# Patient Record
Sex: FEMALE
[De-identification: names, ages, dates, MRNs, and addresses within clinical notes are randomized per-mention and may not be internally consistent; named-entity substitution may affect disease eponyms.]

---

## 2017-11-21 ENCOUNTER — HOSPITAL ENCOUNTER (INPATIENT)
Dept: HOSPITAL 31 - C.ER | Age: 67
LOS: 7 days | Discharge: NURSING, REHAB OR LONG-TERM CARE | DRG: 871 | End: 2017-11-28
Attending: INTERNAL MEDICINE | Admitting: INTERNAL MEDICINE
Payer: MEDICARE

## 2017-11-21 VITALS — BODY MASS INDEX: 23.8 KG/M2

## 2017-11-21 DIAGNOSIS — I13.2: ICD-10-CM

## 2017-11-21 DIAGNOSIS — M80.031K: ICD-10-CM

## 2017-11-21 DIAGNOSIS — J18.9: ICD-10-CM

## 2017-11-21 DIAGNOSIS — I50.9: ICD-10-CM

## 2017-11-21 DIAGNOSIS — Z99.2: ICD-10-CM

## 2017-11-21 DIAGNOSIS — A41.9: Primary | ICD-10-CM

## 2017-11-21 DIAGNOSIS — T86.12: ICD-10-CM

## 2017-11-21 DIAGNOSIS — N18.6: ICD-10-CM

## 2017-11-21 LAB
ALBUMIN/GLOB SERPL: 0.9 {RATIO} (ref 1–2.1)
ALP SERPL-CCNC: 355 U/L (ref 38–126)
ALT SERPL-CCNC: 31 U/L (ref 9–52)
AST SERPL-CCNC: 36 U/L (ref 14–36)
BASE EXCESS BLDV CALC-SCNC: 11.6 MMOL/L (ref 0–2)
BASOPHILS # BLD AUTO: 0.1 K/UL (ref 0–0.2)
BASOPHILS NFR BLD: 0.2 % (ref 0–2)
BILIRUB SERPL-MCNC: 3.5 MG/DL (ref 0.2–1.3)
BUN SERPL-MCNC: 19 MG/DL (ref 7–17)
CALCIUM SERPL-MCNC: 8.9 MG/DL (ref 8.6–10.4)
CHLORIDE SERPL-SCNC: 92 MMOL/L (ref 98–107)
CO2 SERPL-SCNC: 32 MMOL/L (ref 22–30)
EOSINOPHIL # BLD AUTO: 0.1 K/UL (ref 0–0.7)
EOSINOPHIL NFR BLD: 0.4 % (ref 0–4)
ERYTHROCYTE [DISTWIDTH] IN BLOOD BY AUTOMATED COUNT: 17.4 % (ref 11.5–14.5)
GLOBULIN SER-MCNC: 3.8 GM/DL (ref 2.2–3.9)
GLUCOSE SERPL-MCNC: 85 MG/DL (ref 65–105)
HCT VFR BLD CALC: 28 % (ref 34–47)
LYMPHOCYTES # BLD AUTO: 0.9 K/UL (ref 1–4.3)
LYMPHOCYTES NFR BLD AUTO: 3.6 % (ref 20–40)
MCH RBC QN AUTO: 32.2 PG (ref 27–31)
MCHC RBC AUTO-ENTMCNC: 33 G/DL (ref 33–37)
MCV RBC AUTO: 97.5 FL (ref 81–99)
MONOCYTES # BLD: 3.1 K/UL (ref 0–0.8)
MONOCYTES NFR BLD: 11.9 % (ref 0–10)
NEUTROPHILS NFR BLD AUTO: 77 % (ref 50–75)
NRBC BLD AUTO-RTO: 0 % (ref 0–2)
PCO2 BLDV: 56 MMHG (ref 40–60)
PH BLDV: 7.44 [PH] (ref 7.32–7.43)
PLATELET # BLD: 251 K/UL (ref 130–400)
PMV BLD AUTO: 8.8 FL (ref 7.2–11.7)
POTASSIUM SERPL-SCNC: 3.4 MMOL/L (ref 3.6–5.2)
PROT SERPL-MCNC: 7.2 G/DL (ref 6.3–8.3)
SODIUM SERPL-SCNC: 135 MMOL/L (ref 132–148)
TOTAL CELLS COUNTED BLD: 100
TROPONIN I SERPL-MCNC: 0.01 NG/ML (ref 0–0.12)
WBC # BLD AUTO: 26.2 K/UL (ref 4.8–10.8)

## 2017-11-21 RX ADMIN — OXYCODONE HYDROCHLORIDE AND ACETAMINOPHEN PRN TAB: 5; 325 TABLET ORAL at 22:52

## 2017-11-21 NOTE — C.PDOC
History Of Present Illness


67 year old female with a history of Fibromyalgia, Osteoporosis, and End Stage 

Renal was brought to the ED via EMS with complaints of chest pain, fever, 

sweating, and full body aches beginning earlier today while completing 

hemodialysis. Patient is also complaining of large mass to left breast that has 

been followed by PMD. Patient was seen in [facility] on 2017 for 

pain to legs and arms and was diagnoses with pathological fractures to 

bilateral lower extremities and right upper extremities. Patient was seen in an 

ED on 11/3/2017 with similar complaints of total body pain. Patient is 

currently in a rehabilitation center for lower extremity and right upper 

extremity. Patient reports she has had mammograms regarding the breast mass 

with no answers. Patient denies injury, trauma, weakness, numbness, shortness 

of breath, or other complaints a this time. 





Nephrologist: Dr. Silva





Time Seen by Provider: 17 17:14


Chief Complaint (Nursing): Chest Pain


History Per: Patient


History/Exam Limitations: no limitations


Onset/Duration Of Symptoms: Worse Since (earlier today )


Current Symptoms Are (Timing): Still Present


Quality: "Pain"


Associated Symptoms: denies: Nausea, Dyspnea


Modifying Factors: None


Exacerbating Factors: None


Alleviating Factors: None


Recent travel outside of the [place]: No


Additional History Per: EMS, Family, Prior Records





Past Medical History


Reviewed: Historical Data, Nursing Documentation, Vital Signs


Vital Signs: 


 Last Vital Signs











Temp  101.0 F H  17 17:33


 


Pulse  80   17 17:34


 


Resp  18   17 17:33


 


BP  86/42 L  17 17:33


 


Pulse Ox  95   17 18:10














- Medical History


PMH: Anxiety, Arthritis, Diverticulitis, Fibromyalgia, Fractures (Current right 

leg fx), HTN, Osteoporosis, Pancreatitis, End Stage Renal Disease, Chronic 

Kidney Disease, TIA


Surgical History: Cholecystectomy





- [facility]








 (17)


BUNIONECT/SFT/OSTEOTOMY (13)


IMMOBILIZATION OF LEFT LOWER LEG USING SPLINT (17)


IMMOBILIZATION OF RIGHT UPPER EXTREMITY USING SPLINT (17)


OPEN REDUCT-INT FIX TOE (13)








Family History: States: Unknown Family Hx





- Social History


Hx Alcohol Use: No


Hx Substance Use: No





- Immunization History


Hx Tetanus Toxoid Vaccination: No


Hx Influenza Vaccination: No


Hx Pneumococcal Vaccination: No





Review Of Systems


Constitutional: Positive for: Fever, Other (general full body aches ).  

Negative for: Chills


Cardiovascular: Positive for: Chest Pain.  Negative for: Palpitations


Respiratory: Negative for: Cough, Shortness of Breath


Skin: Positive for: Other (large breast mass )





Physical Exam





- Physical Exam


Appears: Non-toxic, No Acute Distress


Skin: Warm, Dry, No Rash


Head: Atraumatic, Normacephalic, No Tenderness


Oral Mucosa: Dry


Neck: Supple


Chest: Symmetrical, No Deformity


Cardiovascular: Rhythm Regular, No Murmur


Respiratory: No Rales, No Rhonchi, No Wheezing, Other (clear to auscultation 

bilaterally )


Gastrointestinal/Abdominal: Soft, No Tenderness, No Distention, No Guarding, No 

Rebound


Extremity: Other (bilateral lower extremities and right upper extremity are in 

splints )


Neurological/Psych: Oriented x3, Other (patient is cooperative )





ED Course And Treatment


ECG: Interpreted By Me, Viewed By Me


ECG Rhythm: Sinus Rhythm


Interpretation Of ECG: No ST elevations or depressions.


Rate From EC


O2 Sat by Pulse Oximetry: 95 (RA)


Pulse Ox Interpretation: Normal





- Radiology


CXR: Interpreted by Me, Viewed By Me


CXR Interpretation: Yes: Other (Bilateral interstital changes )


Progress Note: VBG, EKG, CXR, labs, and blood work were ordered. Patient was 

given Vancomycin and Piperacillin/Tazobactam IVPB.





Medical Decision Making


Medical Decision Making: 





Case discussed with Dr. Allison. She reports she was treating patient with 

Zithromycin. Patient has failed out patient treatment for a pneumonia. 





Disposition


Discussed With : Sulma Allison


Doctor Will See Patient In The: Hospital


Counseled Patient/Family Regarding: Smoking Cessation





- Disposition


Disposition: HOSPITALIZED


Disposition Time: 18:09


Condition: GUARDED


Forms:  [facility] (English)





- POA


Present On Arrival: None





- Clinical Impression


Clinical Impression: 


 Pneumonia, Fever








- Scribe Statement


The provider has reviewed the documentation as recorded by the Scribe





Sally Daniel





All medical record entries made by the Scribe were at my direction and 

personally dictated by me. I have reviewed the chart and agree that the record 

accurately reflects my personal performance of the history, physical exam, 

medical decision making, and the department course for this patient. I have 

also personally directed, reviewed, and agree with the discharge instructions 

and disposition.





Decision To Admit





- Pt Status Changed To:


Hospital Disposition Of: Inpatient





- Admit Certification


Admit to Inpatient:: After my assessment, the patient will require 

hospitalization for at least two midnights.  This is because of the severity of 

symptoms shown, intensity of services needed, and/or the medical risk in this 

patient being treated as an outpatient.





- InPatient:


Physician Admission Certification: I certify that this patient requires 2 or 

more midnights of care for the following reason:: failled out patient therapy 

for pneumonia and fever





- .


Bed Request Type: Regular


Patient Diagnosis: 


 Pneumonia, Fever

## 2017-11-21 NOTE — RAD
PROCEDURE:  CHEST RADIOGRAPH, 1 VIEW



HISTORY:

chest pain



COMPARISON:

None available.



FINDINGS:



LUNGS:

Limited examination. Opacity at both lung bases.  Rule out pneumonia. 

 Follow-up advised.



PLEURA:

No pneumothorax or pleural fluid seen.



CARDIOVASCULAR:

Congestive change.



OSSEOUS STRUCTURES:

No significant abnormalities.



VISUALIZED UPPER ABDOMEN:

Normal.



OTHER FINDINGS:

Left subclavian vascular stent 



IMPRESSION:

Bibasilar opacities. Congestive change. Pneumonia versus pulmonary 

edema. .  Followup advised.

## 2017-11-22 RX ADMIN — TAZOBACTAM SODIUM AND PIPERACILLIN SODIUM SCH MLS/HR: 250; 2 INJECTION, SOLUTION INTRAVENOUS at 14:06

## 2017-11-22 RX ADMIN — TAZOBACTAM SODIUM AND PIPERACILLIN SODIUM SCH MLS/HR: 250; 2 INJECTION, SOLUTION INTRAVENOUS at 21:45

## 2017-11-22 RX ADMIN — IPRATROPIUM BROMIDE AND ALBUTEROL SULFATE SCH ML: .5; 3 SOLUTION RESPIRATORY (INHALATION) at 13:30

## 2017-11-22 RX ADMIN — IPRATROPIUM BROMIDE AND ALBUTEROL SULFATE SCH ML: .5; 3 SOLUTION RESPIRATORY (INHALATION) at 07:53

## 2017-11-22 RX ADMIN — OXYCODONE HYDROCHLORIDE AND ACETAMINOPHEN PRN TAB: 5; 325 TABLET ORAL at 14:04

## 2017-11-22 RX ADMIN — IPRATROPIUM BROMIDE AND ALBUTEROL SULFATE SCH ML: .5; 3 SOLUTION RESPIRATORY (INHALATION) at 20:19

## 2017-11-22 RX ADMIN — METOPROLOL SUCCINATE SCH MG: 100 TABLET, EXTENDED RELEASE ORAL at 09:10

## 2017-11-22 RX ADMIN — TAZOBACTAM SODIUM AND PIPERACILLIN SODIUM SCH MLS/HR: 250; 2 INJECTION, SOLUTION INTRAVENOUS at 09:43

## 2017-11-22 RX ADMIN — CALCIUM CARBONATE-VITAMIN D TAB 500 MG-200 UNIT SCH TAB: 500-200 TAB at 09:11

## 2017-11-22 RX ADMIN — OXYCODONE HYDROCHLORIDE AND ACETAMINOPHEN PRN TAB: 5; 325 TABLET ORAL at 22:05

## 2017-11-22 NOTE — RAD
PROCEDURE:  Left Ankle Radiographs.



HISTORY:

fracture  



COMPARISON:

Comparison is made to the previous study dated 11/06/2017



FINDINGS:



BONES:

The left ankle is again seen in cast which limits the evaluation for 

fine details.



Diffuse osteopenia is again noted.  There are subacute fractures at 

the distal left tibia and fibula again noted with adjacent callus 

formation. No evidence of significant interval change when compared 

to the previous exam. 



JOINTS:

Normal. No osteoarthritis. Ankle mortise maintained. Talar dome intact



SOFT TISSUES:

Normal. 



OTHER FINDINGS:

None.



IMPRESSION:

Subacute fracture at the distal left tibia and fibula are again noted 

surrounding with small callus formation. No significant interval 

change since the previous study.



Mild diffuse osteopenia.

## 2017-11-22 NOTE — CP.PCM.HP
History of Present Illness





- History of Present Illness


History of Present Illness: 





[place] came in [place] fever sob cough aching body for few days and didnot 

imrove with treatment z pzck  and tylenol  and xray has bilateral infiltrate





Present on Admission





- Present on Admission


Any Indicators Present on Admission: No





Review of Systems





- Review of Systems


Systems not reviewed;Unavailable: Acuity of Condition





- Constitutional


Constitutional: Anorexia, Fatigue, Fever, Night Sweats





- EENT


Eyes: As Per HPI


Ears: As Per HPI


Nose/Mouth/Throat: As Per HPI





- Breasts


Additional comments: 





swalen red and tender and warm





- Cardiovascular


Cardiovascular: As Per HPI





- Respiratory


Respiratory: Chest Congestion





- Gastrointestinal


Gastrointestinal: Bloating, Constipation


Additional comments: 





no bowel movements for 3 days





- Genitourinary


Genitourinary: As Per HPI





- Reproductive: Female


Reproductive:Female: As Per HPI





- Menstruation


Menstruation: As Per HPI, Post Menopausal





- Musculoskeletal


Additional comments: 





has fx arms and leg





- Integumentary


Integumentary: As Per HPI





- Neurological


Neurological: As Per HPI





- Psychiatric


Psychiatric: Depression





- Endocrine


Endocrine: As Per HPI





- Hematologic/Lymphatic


Hematologic: As Per HPI





Past Patient History





- Infectious Disease


Hx of Infectious Diseases: None





- Past Medical History & Family History


Past Medical History?: Yes





- Past Social History


Smoking Status: Never Smoked





- CARDIAC


Hx Hypertension: Yes





- PULMONARY


Hx Respiratory Disorders: No





- NEUROLOGICAL


Hx Transient Ischemic Attacks (TIA): Yes





- HEENT


Hx HEENT Problems: No





- RENAL


Hx Chronic Kidney Disease: Yes


Date of Last Dialysis Treatment: 11/21/17





- ENDOCRINE/METABOLIC


Hx Endocrine Disorders: No





- HEMATOLOGICAL/ONCOLOGICAL


Hx Blood Disorders: Yes


Hx Blood Transfusions: Yes


Hx Blood Transfusion Reaction: Yes


Hx Shingles: Yes (right arm)





- INTEGUMENTARY


Hx Dermatological Problems: No





- MUSCULOSKELETAL/RHEUMATOLOGICAL


Hx Arthritis: Yes


Hx Falls: Yes


Hx Fractures: Yes (Current right leg fx, RUE, LLE)


Hx Osteoporosis: Yes





- GASTROINTESTINAL


Hx Diverticulitis: Yes


Hx Pancreatitis: Yes





- GENITOURINARY/GYNECOLOGICAL


Hx Genitourinary Disorders: No


Other/Comment: DIALYSIS PATIENT





- PSYCHIATRIC


Hx Anxiety: Yes


Hx Substance Use: No





- SURGICAL HISTORY


Hx Cholecystectomy: Yes





- ANESTHESIA


Hx Anesthesia: Yes


Hx Anesthesia Reactions: No


Hx Malignant Hyperthermia: No





Meds


Allergies/Adverse Reactions: 


 Allergies











Allergy/AdvReac Type Severity Reaction Status Date / Time


 


aspirin Allergy  NAUSEA Verified 11/21/17 17:14


 


lactose Allergy  NAUSEA Verified 11/21/17 17:14














Physical Exam





- Constitutional


Appears: In Acute Distress





- Head Exam


Head Exam: ATRAUMATIC





- Eye Exam


Eye Exam: Normal appearance


Pupil Exam: NORMAL ACCOMODATION





- ENT Exam


ENT Exam: Mucous Membranes Moist





- Neck Exam


Neck exam: Positive for: Full Rom





- Respiratory Exam


Respiratory Exam: Decreased Breath Sounds, Rales





- Cardiovascular Exam


Cardiovascular Exam: REGULAR RHYTHM





- GI/Abdominal Exam


GI & Abdominal Exam: Distended





- Rectal Exam


Rectal Exam: NORMAL INSPECTION





-  Exam


 Exam: NORMAL INSPECTION





- Extremities Exam


Extremities exam: Positive for: normal inspection





- Back Exam


Back exam: NORMAL INSPECTION





- Neurological Exam


Neurological exam: Oriented x3





- Psychiatric Exam


Psychiatric exam: Normal Affect





- Skin


Skin Exam: Normal Color





Results





- Vital Signs


Recent Vital Signs: 





 Last Vital Signs











Temp  98.1 F   11/22/17 07:58


 


Pulse  66   11/22/17 07:58


 


Resp  18   11/22/17 07:58


 


BP  124/63   11/22/17 07:58


 


Pulse Ox  97   11/22/17 07:58














- Labs


Result Diagrams: 


 11/21/17 18:24





 11/21/17 20:17


Labs: 





 Laboratory Results - last 24 hr











  11/21/17 11/21/17 11/21/17





  18:24 18:25 20:17


 


WBC  26.2 H D  


 


RBC  2.88 L  


 


Hgb  9.3 L  


 


Hct  28.0 L  


 


MCV  97.5  D  


 


MCH  32.2 H  


 


MCHC  33.0  


 


RDW  17.4 H  


 


Plt Count  251  


 


MPV  8.8  


 


Neut % (Auto)  83.9 H  


 


Lymph % (Auto)  3.6 L  


 


Mono % (Auto)  11.9 H  


 


Eos % (Auto)  0.4  


 


Baso % (Auto)  0.2  


 


Neut #  22.0 H  


 


Lymph #  0.9 L  


 


Mono #  3.1 H  


 


Eos #  0.1  


 


Baso #  0.1  


 


Neutrophils % (Manual)  77 H  


 


Lymphocytes % (Manual)  11 L  


 


Monocytes % (Manual)  12 H  


 


Platelet Estimate  Normal  


 


Poikilocytosis (manual  Slight  


 


Anisocytosis (manual)  Slight  


 


pO2   26 L 


 


VBG pH   7.44 H 


 


VBG pCO2   56 


 


VBG HCO3   32.7 


 


VBG Total CO2   39.7 H 


 


VBG O2 Sat (Calc)   57.1 


 


VBG Base Excess   11.6 H 


 


VBG Potassium   6.0 H 


 


Sodium   137.0  135


 


Chloride   103.0  92 L


 


Glucose   86 


 


Lactate   1.9 


 


Potassium    3.4 L


 


Carbon Dioxide    32 H


 


Anion Gap    14


 


BUN    19 H


 


Creatinine    2.5 H


 


Est GFR ( Amer)    23


 


Est GFR (Non-Af Amer)    19


 


Random Glucose    85


 


Calcium    8.9


 


Total Bilirubin    3.5 H


 


AST    36


 


ALT    31


 


Alkaline Phosphatase    355 H D


 


Troponin I    0.0120


 


NT-Pro-B Natriuret Pep    019597 H


 


Total Protein    7.2


 


Albumin    3.4 L D


 


Globulin    3.8


 


Albumin/Globulin Ratio    0.9 L


 


Venous Blood Potassium   6.0 H 














Assessment & Plan





- Assessment and Plan (Free Text)


Assessment: 





ac bi;lateral pnumonia  ESRF FX ARM AND LEG 


Plan: 





AS PER PRDERS





- Date & Time


Date: 11/22/17


Time: 10:54

## 2017-11-22 NOTE — CP.PCM.CON
History of Present Illness





- History of Present Illness


History of Present Illness: 





Orthopedic consultation Dr. Coombs





67F known to service admitted for sepsis. She admits to feeling weak and 

shaking chills. Denies any CP/SOB/numbness/tinglilng.





She was last seen in house approx 3 weeks ago, and she did not follow up with 

ortho as instructed in office. She had right midshaft both bone forearm fracture

, healed ulna and non union of radius. She says in ER they were having 

difficulty finding an IV and the splint was removed. She has not been weight 

bearing, and says the splint to her left leg is the same. She has a fracture 

boot from podiatrist on her left ankle. 





When asked why she did not follow up as instructed, she says that she told the 

rehab she needed to see the doctor, but they did not arrange the appointment. 





Review of Systems





- Review of Systems


All systems: reviewed and no additional remarkable complaints except





- Constitutional


Constitutional: As Per HPI





- Cardiovascular


Cardiovascular: As Per HPI





- Respiratory


Respiratory: As Per HPI





- Gastrointestinal


Additional comments: 





denies





- Musculoskeletal


Musculoskeletal: As Per HPI





- Neurological


Neurological: As Per HPI





- Hematologic/Lymphatic


Hematologic: absent: As Per HPI, Easy Bleeding, Easy Bruising, Lymphadenopathy, 

Other





Past Patient History





- Infectious Disease


Hx of Infectious Diseases: None





- Past Medical History & Family History


Past Medical History?: Yes


Past Family History: Reviewed and not pertinent





- Past Social History


Smoking Status: Never Smoked





- CARDIAC


Hx Hypertension: Yes





- PULMONARY


Hx Respiratory Disorders: No





- NEUROLOGICAL


HX Cerebrovascular Accident: Yes (TIA)





- HEENT


Hx HEENT Problems: No





- RENAL


Hx Renal Failure: Yes (ESRD, CKD)





- ENDOCRINE/METABOLIC


Hx Endocrine Disorders: No





- HEMATOLOGICAL/ONCOLOGICAL


Hx Blood Disorders: Yes


Hx Blood Transfusions: Yes


Hx Blood Transfusion Reaction: Yes


Hx Shingles: Yes (right arm)





- INTEGUMENTARY


Hx Dermatological Problems: No





- MUSCULOSKELETAL/RHEUMATOLOGICAL


Hx Arthritis: Yes (OA)





- GASTROINTESTINAL


Hx Diverticulitis: Yes


Hx Pancreatitis: Yes





- GENITOURINARY/GYNECOLOGICAL


Hx Genitourinary Disorders: No


Other/Comment: DIALYSIS PATIENT





- PSYCHIATRIC


Hx Anxiety: Yes


Hx Substance Use: No





- SURGICAL HISTORY


Hx Cholecystectomy: Yes





- ANESTHESIA


Hx Anesthesia: Yes


Hx Anesthesia Reactions: No


Hx Malignant Hyperthermia: No





Meds


Allergies/Adverse Reactions: 


 Allergies











Allergy/AdvReac Type Severity Reaction Status Date / Time


 


aspirin Allergy  NAUSEA Verified 11/21/17 17:14


 


lactose Allergy  NAUSEA Verified 11/21/17 17:14














- Medications


Medications: 


 Current Medications





Acetaminophen (Tylenol 325mg Tab)  650 mg PO Q6 PRN


   PRN Reason: Fever >100.4 F


Albuterol/Ipratropium (Duoneb 3 Mg/0.5 Mg (3 Ml) Ud)  3 ml INH RTID [facility]


   Last Admin: 11/22/17 13:30 Dose:  3 ml


Calcium/Vitamin D (Oyster Shell Calcium/Vitamin D 500 Mg-200 Iu)  1 tab PO 

DAILY [facility]


   Last Admin: 11/22/17 09:11 Dose:  1 tab


Clonidine HCl (Catapres)  0.1 mg PO HS [facility]


Docusate Sodium (Colace)  100 mg PO BID PRN


   PRN Reason: Constipation


Heparin Sodium (Porcine) (Heparin)  5,000 units SC Q8 [facility]


   Last Admin: 11/22/17 14:13 Dose:  5,000 units


Piperacillin Sod/Tazobactam Sod (Zosyn 2.25 Gm Iv Premix)  2.25 gm in 50 mls @ 

100 mls/hr IVPB Q8 [facility]


   Last Admin: 11/22/17 14:06 Dose:  100 mls/hr


Azithromycin 500 mg/ Sodium (Chloride)  250 mls @ 250 mls/hr IVPB DAILY [facility]


   Last Admin: 11/22/17 10:55 Dose:  250 mls/hr


Metoprolol Succinate (Toprol Xl)  100 mg PO DAILY [facility]


   Last Admin: 11/22/17 09:10 Dose:  100 mg


Oxycodone/Acetaminophen (Percocet 5/325 Mg Tab)  1 tab PO Q8 PRN


   PRN Reason: Pain, moderate (4-7)


   Stop: 11/25/17 06:01


   Last Admin: 11/22/17 14:04 Dose:  1 tab


Oxycodone/Acetaminophen (Percocet 5/325 Mg Tab)  1 tab PO Q4H PRN


   PRN Reason: Pain, severe (8-10)


   Stop: 11/24/17 22:43


Sertraline HCl (Zoloft)  100 mg PO DAILY [facility]


   Last Admin: 11/22/17 09:10 Dose:  100 mg


Sodium Polystyrene Sulfonate (Kayexalate Susp)  15 gm PO ONCE PRN


   PRN Reason: K LEVEL ABOVE 5.5











Physical Exam





- Constitutional


Appears: No Acute Distress





- Respiratory Exam


Respiratory Exam: NORMAL BREATHING PATTERN





- Cardiovascular Exam


Additional comments: 





+radial pulse





- Expanded Upper Extremities Exam


  ** Right


Upper Arm exam: full ROM, normal inspection


Forearm Wrist exam: deformity, erythema (two slabs of splint material on 

forearm and around elbow, noted increased deformity from last exam, however 

radius still mobile.), swelling


Neuro motor exam: finger 2-5 abduction intact, thumb abduction, thumb IP 

flexion intact, thumb opposition intact, wrist extension intact


Neurosensory exam: median nerve intact, radial nerve intact, ulnar nerve intact


Vascular exam: radial pulse





- Expanded Lower Extremities Exam


  ** Left


Foot/Toe exam: full ROM (+ROM toes flex/ext, toes warm, sensation intact, 

splint intact)





- Neurological Exam


Neurological exam: Alert, Oriented x3





- Psychiatric Exam


Psychiatric exam: Normal Affect, Normal Mood





- Skin


Skin Exam: Dry, Intact, Normal Color, Warm





Results





- Vital Signs


Recent Vital Signs: 


 Last Vital Signs











Temp  98.7 F   11/22/17 17:14


 


Pulse  74   11/22/17 17:14


 


Resp  20   11/22/17 17:14


 


BP  139/68   11/22/17 17:14


 


Pulse Ox  97   11/22/17 17:14














- Labs


Result Diagrams: 


 11/21/17 18:24





 11/21/17 20:17


Labs: 


 Laboratory Results - last 24 hr











  11/21/17 11/21/17 11/21/17





  18:24 18:25 20:17


 


WBC  26.2 H D  


 


RBC  2.88 L  


 


Hgb  9.3 L  


 


Hct  28.0 L  


 


MCV  97.5  D  


 


MCH  32.2 H  


 


MCHC  33.0  


 


RDW  17.4 H  


 


Plt Count  251  


 


MPV  8.8  


 


Neut % (Auto)  83.9 H  


 


Lymph % (Auto)  3.6 L  


 


Mono % (Auto)  11.9 H  


 


Eos % (Auto)  0.4  


 


Baso % (Auto)  0.2  


 


Neut #  22.0 H  


 


Lymph #  0.9 L  


 


Mono #  3.1 H  


 


Eos #  0.1  


 


Baso #  0.1  


 


Neutrophils % (Manual)  77 H  


 


Lymphocytes % (Manual)  11 L  


 


Monocytes % (Manual)  12 H  


 


Platelet Estimate  Normal  


 


Poikilocytosis (manual  Slight  


 


Anisocytosis (manual)  Slight  


 


pO2   26 L 


 


VBG pH   7.44 H 


 


VBG pCO2   56 


 


VBG HCO3   32.7 


 


VBG Total CO2   39.7 H 


 


VBG O2 Sat (Calc)   57.1 


 


VBG Base Excess   11.6 H 


 


VBG Potassium   6.0 H 


 


Sodium   137.0  135


 


Chloride   103.0  92 L


 


Glucose   86 


 


Lactate   1.9 


 


Potassium    3.4 L


 


Carbon Dioxide    32 H


 


Anion Gap    14


 


BUN    19 H


 


Creatinine    2.5 H


 


Est GFR ( Amer)    23


 


Est GFR (Non-Af Amer)    19


 


Random Glucose    85


 


Calcium    8.9


 


Total Bilirubin    3.5 H


 


AST    36


 


ALT    31


 


Alkaline Phosphatase    355 H D


 


Troponin I    0.0120


 


NT-Pro-B Natriuret Pep    850193 H


 


Total Protein    7.2


 


Albumin    3.4 L D


 


Globulin    3.8


 


Albumin/Globulin Ratio    0.9 L


 


Venous Blood Potassium   6.0 H 














Assessment & Plan


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


Assessment and Plan: 


Ulna healed, radius non union


advised patient that radius appears more angulation than last exam, and that 

closed reduction is indicated. Risks/benefits/alt explained, patient verbally 

consented to procedure. Time out performed. Closed reduction of right radius 

and sugar tong splint applied. Patient tolerated well. NVID pre and post 

procedure.





post reduction films improved, but still sig angulation. Will elevate hand and 

let swelling improved, and consider repeat closed reduction and cast 

application for better immobilization. 








Status: Chronic   





(2) Closed fracture of shaft of right radius and ulna


Status: Chronic   





(3) Closed fracture of distal end of left fibula and tibia


Assessment and Plan: 


imaging reviewed, shows more callus, maintained position of stress fractures.


continue NWB/splint


elevate


will cast prior to d/c


d/w Dr. Coombs, agrees with above


Status: Chronic   





Radiology Interpretation





- 


:: Radiologist, Consultant





- Notes:


Notes:: 





Patient Name / ID : YAYA CLIFTON  / [id number]


Exam Date : 11/22/2017 12:45:07 ( Approved )


Study Comment : 


Sex / Age : F  / 67Y





Creator : Carlyle Murcia MD


Dictator : Carlyle Murcia MD


 : 


Approver : Carlyle Murcia MD


Approver2 : 





Report Date : 11/22/2017 15:58:45


My Comment : 


********************************************************************************

***





PROCEDURE:  Left Ankle Radiographs.





HISTORY:


fracture  





COMPARISON:


Comparison is made to the previous study dated 11/06/2017





FINDINGS:





BONES:


The left ankle is again seen in cast which limits the evaluation for fine 

details.





Diffuse osteopenia is again noted.  There are subacute fractures at the distal 

left tibia and fibula again noted with adjacent callus formation. No evidence 

of significant interval change when compared to the previous exam. 





JOINTS:


Normal. No osteoarthritis. Ankle mortise maintained. Talar dome intact





SOFT TISSUES:


Normal. 





OTHER FINDINGS:


None.





IMPRESSION:


Subacute fracture at the distal left tibia and fibula are again noted 

surrounding with small callus formation. No significant interval change since 

the previous study.





Mild diffuse osteopenia.


atient Name / ID : YAYA CLIFTON  / [id number]


Exam Date : 11/22/2017 12:45:07 ( Approved )


Study Comment : 


Sex / Age : F  / 67Y





Creator : Carlyle Murcia MD


Dictator : Carlyle Murcia MD


 : 


Approver : Carlyle Murcia MD


Approver2 : 





Report Date : 11/22/2017 15:57:12


My Comment : 


********************************************************************************

***





PROCEDURE:  Radiographs of the Right Forearm 





HISTORY:


fracture











COMPARISON:


Comparison is made to the previous exam dated 11/06/2017.





TECHNIQUE:


Frontal and lateral views obtained. 





FINDINGS:





BONES:


The right forearm is again seen in cast which limits the evaluation for fine 

details. Subacute fracture at the midshaft of the right radius and ulnar bones 

are again seen. The right radius fracture is mildly displaced.  No evidence of 

significant callus formation noted around the right radius fracture.  Callus 

formation is again noted around the right ulnar fracture.





JOINT SPACES:


Unremarkable.





OTHER FINDINGS:


None.





IMPRESSION:


Findings have not significantly changed when compared to the previous exam. 





Angulated mildly displaced fracture at the midshaft of the right radius is 

again noted without evidence of significant callus formation.





Healed fracture at the midshaft of the right ulnar bone surrounding with callus 

formation is again noted.
































- Radiology Interpretation #2


Interpretation: 





Post reduction films ap/lat of right forearm show improved angulation from ER 

films, but still some angulation and displacement





Left ankle films do show slightly more callus to the tibial fracture than on 

prior films





Procedures


Attestation:: I certify that I have explained the specified Operation(s) or 

Procedure(s), risks, benefits and reasonable alternatives to the Patient and/or 

other person responsible. The opportunity was given to ask questions and all 

questions answered





- Orthopedic Fracture Reduction


  ** Fracture #1


Consent Obtained: verbal consent


Time Out Performed: Yes


Side: right


Fracture Reduction Location: radius


Analgesia: none


Technique: direct manipulation


Post Reduction X-rays Demonstrate: acceptable reduction


Post-Reduction Neuro Exam: intact


Post-Reduction Vascular Exam: intact


Splint Applied: Yes


Patient Tolerated Procedure: well





- Orthopedic Splinting/Casting


  ** Injury #1


Side: right


Upper Extremity Injury Location: forearm


Upper Extremity Immobilizer: sugar tong splint

## 2017-11-22 NOTE — RAD
PROCEDURE:  Radiographs of the Right Forearm 



HISTORY:

fracture







COMPARISON:

Comparison is made to the previous exam dated 11/06/2017.



TECHNIQUE:

Frontal and lateral views obtained. 



FINDINGS:



BONES:

The right forearm is again seen in cast which limits the evaluation 

for fine details. Subacute fracture at the midshaft of the right 

radius and ulnar bones are again seen. The right radius fracture is 

mildly displaced.  No evidence of significant callus formation noted 

around the right radius fracture.  Callus formation is again noted 

around the right ulnar fracture.



JOINT SPACES:

Unremarkable.



OTHER FINDINGS:

None.



IMPRESSION:

Findings have not significantly changed when compared to the previous 

exam. 



Angulated mildly displaced fracture at the midshaft of the right 

radius is again noted without evidence of significant callus 

formation.



Healed fracture at the midshaft of the right ulnar bone surrounding 

with callus formation is again noted.

## 2017-11-22 NOTE — CARD
--------------- APPROVED REPORT --------------





EKG Measurement

Heart [id number]

TN [id number]

[id number]

[id number]

[id number]



<Conclusion>

Normal sinus rhythm

Cannot rule out Anterior infarct, age undetermined

Abnormal ECG

## 2017-11-23 LAB
BASOPHILS # BLD AUTO: 0.1 K/UL (ref 0–0.2)
BASOPHILS NFR BLD: 0.4 % (ref 0–2)
BUN SERPL-MCNC: 47 MG/DL (ref 7–17)
CALCIUM SERPL-MCNC: 9.3 MG/DL (ref 8.6–10.4)
CHLORIDE SERPL-SCNC: 89 MMOL/L (ref 98–107)
CO2 SERPL-SCNC: 27 MMOL/L (ref 22–30)
EOSINOPHIL # BLD AUTO: 0.5 K/UL (ref 0–0.7)
EOSINOPHIL NFR BLD: 1 % (ref 0–4)
EOSINOPHIL NFR BLD: 2.6 % (ref 0–4)
ERYTHROCYTE [DISTWIDTH] IN BLOOD BY AUTOMATED COUNT: 17.1 % (ref 11.5–14.5)
GLUCOSE SERPL-MCNC: 111 MG/DL (ref 65–105)
HCT VFR BLD CALC: 26.4 % (ref 34–47)
LG PLATELETS BLD QL SMEAR: PRESENT
LYMPHOCYTES # BLD AUTO: 1.4 K/UL (ref 1–4.3)
LYMPHOCYTES NFR BLD AUTO: 7.9 % (ref 20–40)
MCH RBC QN AUTO: 32 PG (ref 27–31)
MCHC RBC AUTO-ENTMCNC: 33.3 G/DL (ref 33–37)
MCV RBC AUTO: 96.2 FL (ref 81–99)
MONOCYTES # BLD: 1.8 K/UL (ref 0–0.8)
MONOCYTES NFR BLD: 10.2 % (ref 0–10)
NEUTROPHILS NFR BLD AUTO: 73 % (ref 50–75)
NRBC BLD AUTO-RTO: 0.1 % (ref 0–2)
PLATELET # BLD: 240 K/UL (ref 130–400)
PMV BLD AUTO: 8.1 FL (ref 7.2–11.7)
POTASSIUM SERPL-SCNC: 3.8 MMOL/L (ref 3.6–5.2)
SODIUM SERPL-SCNC: 133 MMOL/L (ref 132–148)
TOTAL CELLS COUNTED BLD: 100
VARIANT LYMPHS NFR BLD MANUAL: 2 % (ref 0–0)
WBC # BLD AUTO: 18 K/UL (ref 4.8–10.8)

## 2017-11-23 RX ADMIN — ERYTHROPOIETIN SCH UNIT: 10000 INJECTION, SOLUTION INTRAVENOUS; SUBCUTANEOUS at 13:24

## 2017-11-23 RX ADMIN — IPRATROPIUM BROMIDE AND ALBUTEROL SULFATE SCH ML: .5; 3 SOLUTION RESPIRATORY (INHALATION) at 22:31

## 2017-11-23 RX ADMIN — OXYCODONE HYDROCHLORIDE AND ACETAMINOPHEN PRN TAB: 5; 325 TABLET ORAL at 15:23

## 2017-11-23 RX ADMIN — IPRATROPIUM BROMIDE AND ALBUTEROL SULFATE SCH: .5; 3 SOLUTION RESPIRATORY (INHALATION) at 13:40

## 2017-11-23 RX ADMIN — TAZOBACTAM SODIUM AND PIPERACILLIN SODIUM SCH: 250; 2 INJECTION, SOLUTION INTRAVENOUS at 13:46

## 2017-11-23 RX ADMIN — TAZOBACTAM SODIUM AND PIPERACILLIN SODIUM SCH MLS/HR: 250; 2 INJECTION, SOLUTION INTRAVENOUS at 05:29

## 2017-11-23 RX ADMIN — OXYCODONE HYDROCHLORIDE AND ACETAMINOPHEN PRN TAB: 5; 325 TABLET ORAL at 08:17

## 2017-11-23 RX ADMIN — CALCIUM CARBONATE-VITAMIN D TAB 500 MG-200 UNIT SCH: 500-200 TAB at 09:57

## 2017-11-23 RX ADMIN — TAZOBACTAM SODIUM AND PIPERACILLIN SODIUM SCH MLS/HR: 250; 2 INJECTION, SOLUTION INTRAVENOUS at 22:00

## 2017-11-23 RX ADMIN — METOPROLOL SUCCINATE SCH: 100 TABLET, EXTENDED RELEASE ORAL at 09:57

## 2017-11-23 RX ADMIN — IPRATROPIUM BROMIDE AND ALBUTEROL SULFATE SCH ML: .5; 3 SOLUTION RESPIRATORY (INHALATION) at 07:57

## 2017-11-23 NOTE — RAD
PROCEDURE:  Radiographs of the Right Forearm 



HISTORY:

post reduction







COMPARISON:

Comparison is made to the previous same-day exam.



TECHNIQUE:

Frontal and lateral views obtained. 



FINDINGS:



BONES:

Again seen is acute mildly displaced fracture at the midshaft of the 

left radius. Again seen is subacute or old fracture at the midshaft 

of the right eye mass surrounding with callus formation.



JOINT SPACES:

Unremarkable.



OTHER FINDINGS:

None.



IMPRESSION:

Re- demonstration of acute mildly angulated fracture at the midshaft 

of the right radius. Fracture at the midshaft of the right ulna 

surrounding with callus formation is also again noted.

## 2017-11-23 NOTE — CP.PCM.CON
History of Present Illness





- History of Present Illness


History of Present Illness: 








67 year old female with a history of Fibromyalgia, Osteoporosis, and End Stage 

Renal was brought to the ED via EMS with complaints of chest pain, fever, 

sweating, and full body aches beginning earlier today while completing 

hemodialysis. Patient is also complaining of large mass to left breast that has 

been followed by PMD. Patient was seen in [facility] on 11/02/2017 for 

pain to legs and arms and was diagnoses with pathological fractures to 

bilateral lower extremities and right upper extremities. Patient was seen in an 

ED on 11/3/2017 with similar complaints of total body pain. Patient is 

currently in a rehabilitation center for lower extremity and right upper 

extremity. Patient reports she has had mammograms regarding the breast mass 

with no answers. Patient denies injury, trauma, weakness, numbness, shortness 

of breath, or other complaints a this time. 








PMH:


ESRD


FAILED RENAL TRANSPLANT


SEVERE SECONDARY HYPERPARATHYROIDISM


HTN


COPD


CARDIOMYOPATHY





PSH:


RENAL TRANSPLANT


AV FISTULA


REPAIR OF FRACTURES- LEs, UEs








Review of Systems





- Constitutional


Constitutional: Chills, Fatigue, Weakness





- EENT


Eyes: Blurred Vision


Ears: absent: As Per HPI, Decreased Hearing, Ear Discharge, Ear Pain, Tinnitus, 

Abnormal Hearing, Disequilibrium, Dizziness, Other


Nose/Mouth/Throat: absent: As Per HPI, Epistaxis, Nasal Congestion, Nasal 

Discharge, Nasal Obstruction, Nasal Trauma, Nose Pain, Post Nasal Drip, Sinus 

Pain, Sinus Pressure, Bleeding Gums, Change in Voice, Dental Pain, Dry Mouth, 

Dysphagia, Halitosis, Hoarsness, Lip Swelling, Mouth Lesions, Mouth Pain, 

Odynophagia, Sore Throat, Throat Swelling, Tongue Swelling, Facial Pain, Neck 

Pain, Neck Mass, Other





- Cardiovascular


Cardiovascular: Chest Pain with Activity, Dyspnea on Exertion





- Respiratory


Respiratory: Cough, Dyspnea on Exertion





- Gastrointestinal


Gastrointestinal: Dyspepsia, Nausea





- Genitourinary


Genitourinary: As Per HPI





- Musculoskeletal


Musculoskeletal: Muscle Cramps, Muscle Weakness, Stiffness





- Neurological


Neurological: Weakness





Past Patient History





- Infectious Disease


Hx of Infectious Diseases: None





- Past Medical History & Family History


Past Medical History?: Yes


Pertinent Family History: 





BROTHER ON DIALYSIS





- Past Social History


Smoking Status: Never Smoked


Chewing Tobacco Use: No


Cigar Use: No


Alcohol: None


Drugs: Denies


Home Situation {Lives}: Nursing Home





- CARDIAC


Hx Cardiac Disorders: Yes


Hx Congestive Heart Failure: Yes


Hx Hypertension: Yes





- PULMONARY


Hx Respiratory Disorders: No





- NEUROLOGICAL


HX Cerebrovascular Accident: Yes (TIA)





- HEENT


Hx HEENT Problems: No





- RENAL


Hx Renal Failure: Yes (ESRD, CKD)





- ENDOCRINE/METABOLIC


Hx Endocrine Disorders: No





- HEMATOLOGICAL/ONCOLOGICAL


Hx Blood Disorders: Yes


Hx Blood Transfusions: Yes


Hx Blood Transfusion Reaction: Yes


Hx Shingles: Yes (right arm)





- INTEGUMENTARY


Hx Dermatological Problems: No





- MUSCULOSKELETAL/RHEUMATOLOGICAL


Hx Arthritis: Yes (OA)





- GASTROINTESTINAL


Hx Diverticulitis: Yes


Hx Pancreatitis: Yes





- GENITOURINARY/GYNECOLOGICAL


Hx Genitourinary Disorders: No


Other/Comment: DIALYSIS PATIENT





- PSYCHIATRIC


Hx Anxiety: Yes


Hx Substance Use: No





- SURGICAL HISTORY


Hx Cholecystectomy: Yes





- ANESTHESIA


Hx Anesthesia: Yes


Hx Anesthesia Reactions: No


Hx Malignant Hyperthermia: No





Meds


Allergies/Adverse Reactions: 


 Allergies











Allergy/AdvReac Type Severity Reaction Status Date / Time


 


aspirin Allergy  NAUSEA Verified 11/21/17 17:14


 


lactose Allergy  NAUSEA Verified 11/21/17 17:14














- Medications


Medications: 


 Current Medications





Acetaminophen (Tylenol 325mg Tab)  650 mg PO Q6 PRN


   PRN Reason: Fever >100.4 F


Albuterol/Ipratropium (Duoneb 3 Mg/0.5 Mg (3 Ml) Ud)  3 ml INH RTID [facility]


   Last Admin: 11/23/17 07:57 Dose:  3 ml


Calcium/Vitamin D (Oyster Shell Calcium/Vitamin D 500 Mg-200 Iu)  1 tab PO 

DAILY [facility]


   Last Admin: 11/22/17 09:11 Dose:  1 tab


Clonidine HCl (Catapres)  0.1 mg PO HS [facility]


   Last Admin: 11/22/17 21:45 Dose:  0.1 mg


Docusate Sodium (Colace)  100 mg PO BID PRN


   PRN Reason: Constipation


Heparin Sodium (Porcine) (Heparin)  5,000 units SC Q8 [facility]


   Last Admin: 11/23/17 05:30 Dose:  5,000 units


Piperacillin Sod/Tazobactam Sod (Zosyn 2.25 Gm Iv Premix)  2.25 gm in 50 mls @ 

100 mls/hr IVPB Q8 [facility]


   Last Admin: 11/23/17 05:29 Dose:  100 mls/hr


Azithromycin 500 mg/ Sodium (Chloride)  250 mls @ 250 mls/hr IVPB DAILY [facility]


   Last Admin: 11/22/17 10:55 Dose:  250 mls/hr


Metoprolol Succinate (Toprol Xl)  100 mg PO DAILY [facility]


   Last Admin: 11/22/17 09:10 Dose:  100 mg


Oxycodone/Acetaminophen (Percocet 5/325 Mg Tab)  1 tab PO Q8 PRN


   PRN Reason: Pain, moderate (4-7)


   Stop: 11/25/17 06:01


   Last Admin: 11/23/17 08:17 Dose:  1 tab


Oxycodone/Acetaminophen (Percocet 5/325 Mg Tab)  1 tab PO Q4H PRN


   PRN Reason: Pain, severe (8-10)


   Stop: 11/24/17 22:43


Sertraline HCl (Zoloft)  100 mg PO DAILY [facility]


   Last Admin: 11/22/17 09:10 Dose:  100 mg


Sodium Polystyrene Sulfonate (Kayexalate Susp)  15 gm PO ONCE PRN


   PRN Reason: K LEVEL ABOVE 5.5











Physical Exam





- Constitutional


Appears: Non-toxic, Chronically Ill





- Head Exam


Head Exam: ATRAUMATIC, NORMAL INSPECTION





- Eye Exam


Eye Exam: EOMI, Normal appearance





- Neck Exam


Neck exam: Positive for: Normal Inspection.  Negative for: Tenderness





- Respiratory Exam


Respiratory Exam: Decreased Breath Sounds, Rhonchi





- Cardiovascular Exam


Cardiovascular Exam: REGULAR RHYTHM, +S1





- GI/Abdominal Exam


GI & Abdominal Exam: Soft.  absent: Tenderness





- Extremities Exam


Extremities exam: Positive for: normal inspection, tenderness





- Neurological Exam


Neurological exam: Alert, CN II-XII Intact, Oriented x3





- Skin


Skin Exam: Dry, Warm





Results





- Vital Signs


Recent Vital Signs: 


 Last Vital Signs











Temp  98.2 F   11/22/17 23:18


 


Pulse  79   11/22/17 23:18


 


Resp  20   11/22/17 23:18


 


BP  122/63   11/22/17 23:18


 


Pulse Ox  99   11/22/17 23:18














- Labs


Result Diagrams: 


 11/21/17 18:24





 11/21/17 20:17





Assessment & Plan


(1) Failed kidney transplant


Status: Acute   





(2) CHF (congestive heart failure)


Status: Acute   





(3) End stage renal disease


Status: Acute   





(4) HTN (hypertension)


Status: Acute   





(5) Closed fracture of distal end of left fibula and tibia


Status: Chronic   





- Assessment and Plan (Free Text)


Plan: 





dialysis today


blood cultures


iv ABs


ortho f/u for casts and recent fxs

## 2017-11-23 NOTE — CP.PCM.PN
Subjective





- Date & Time of Evaluation


Date of Evaluation: 11/23/17


Time of Evaluation: 11:15





- Subjective


Subjective: 





less cough today no fever blood culture positive bact 





Objective





- Vital Signs/Intake and Output


Vital Signs (last 24 hours): 


 











Temp Pulse Resp BP Pulse Ox


 


 97.7 F   77   16   127/54 L  98 


 


 11/23/17 10:30  11/23/17 10:30  11/23/17 10:30  11/23/17 10:30  11/23/17 08:05








Intake and Output: 


 











 11/23/17 11/23/17





 06:59 18:59


 


Intake Total 550 


 


Balance 550 














- Medications


Medications: 


 Current Medications





Acetaminophen (Tylenol 325mg Tab)  650 mg PO Q6 PRN


   PRN Reason: Fever >100.4 F


Albuterol/Ipratropium (Duoneb 3 Mg/0.5 Mg (3 Ml) Ud)  3 ml INH RTID [facility]


   Last Admin: 11/23/17 07:57 Dose:  3 ml


Calcium/Vitamin D (Oyster Shell Calcium/Vitamin D 500 Mg-200 Iu)  1 tab PO 

DAILY [facility]


   Last Admin: 11/23/17 09:57 Dose:  Not Given


Clonidine HCl (Catapres)  0.1 mg PO HS [facility]


   Last Admin: 11/22/17 21:45 Dose:  0.1 mg


Docusate Sodium (Colace)  100 mg PO BID PRN


   PRN Reason: Constipation


Epoetin Jeff (Procrit)  10,000 unit IV TTS [facility]


Heparin Sodium (Porcine) (Heparin)  5,000 units SC Q8 [facility]


   Last Admin: 11/23/17 05:30 Dose:  5,000 units


Piperacillin Sod/Tazobactam Sod (Zosyn 2.25 Gm Iv Premix)  2.25 gm in 50 mls @ 

100 mls/hr IVPB Q8 [facility]


   Last Admin: 11/23/17 05:29 Dose:  100 mls/hr


Azithromycin 500 mg/ Sodium (Chloride)  250 mls @ 250 mls/hr IVPB DAILY [facility]


   Last Admin: 11/23/17 09:57 Dose:  Not Given


Metoprolol Succinate (Toprol Xl)  100 mg PO DAILY [facility]


   Last Admin: 11/23/17 09:57 Dose:  Not Given


Oxycodone/Acetaminophen (Percocet 5/325 Mg Tab)  1 tab PO Q8 PRN


   PRN Reason: Pain, moderate (4-7)


   Stop: 11/25/17 06:01


   Last Admin: 11/23/17 08:17 Dose:  1 tab


Oxycodone/Acetaminophen (Percocet 5/325 Mg Tab)  1 tab PO Q4H PRN


   PRN Reason: Pain, severe (8-10)


   Stop: 11/24/17 22:43


Sertraline HCl (Zoloft)  100 mg PO DAILY [facility]


   Last Admin: 11/23/17 09:57 Dose:  Not Given


Sodium Polystyrene Sulfonate (Kayexalate Susp)  15 gm PO ONCE PRN


   PRN Reason: K LEVEL ABOVE 5.5











- Labs


Labs: 


 





 11/23/17 10:37 





 11/21/17 20:17 











- Constitutional


Appears: Non-toxic





- Head Exam


Head Exam: NORMAL INSPECTION





- Eye Exam


Eye Exam: Normal appearance


Pupil Exam: NORMAL ACCOMODATION





- ENT Exam


ENT Exam: Mucous Membranes Moist





- Neck Exam


Neck Exam: Full ROM





- Respiratory Exam


Respiratory Exam: NORMAL BREATHING PATTERN





- Cardiovascular Exam


Cardiovascular Exam: REGULAR RHYTHM





- GI/Abdominal Exam


GI & Abdominal Exam: Normal Bowel Sounds





- Rectal Exam


Rectal Exam: NORMAL INSPECTION





- Extremities Exam


Additional comments: 





fx left leg and foot and r upper ext





- Back Exam


Back Exam: NORMAL INSPECTION





- Psychiatric Exam


Psychiatric exam: Normal Affect





- Skin


Skin Exam: Dry





Assessment and Plan





- Assessment and Plan (Free Text)


Assessment: 





bactreamia posible pnumonia fx upper and lower ext ESRF 


Plan: 





AS PER ORDERS

## 2017-11-24 RX ADMIN — IPRATROPIUM BROMIDE AND ALBUTEROL SULFATE SCH ML: .5; 3 SOLUTION RESPIRATORY (INHALATION) at 14:04

## 2017-11-24 RX ADMIN — CALCIUM CARBONATE-VITAMIN D TAB 500 MG-200 UNIT SCH TAB: 500-200 TAB at 09:16

## 2017-11-24 RX ADMIN — TAZOBACTAM SODIUM AND PIPERACILLIN SODIUM SCH MLS/HR: 250; 2 INJECTION, SOLUTION INTRAVENOUS at 21:05

## 2017-11-24 RX ADMIN — IPRATROPIUM BROMIDE AND ALBUTEROL SULFATE SCH: .5; 3 SOLUTION RESPIRATORY (INHALATION) at 20:55

## 2017-11-24 RX ADMIN — TAZOBACTAM SODIUM AND PIPERACILLIN SODIUM SCH MLS/HR: 250; 2 INJECTION, SOLUTION INTRAVENOUS at 14:11

## 2017-11-24 RX ADMIN — IPRATROPIUM BROMIDE AND ALBUTEROL SULFATE SCH ML: .5; 3 SOLUTION RESPIRATORY (INHALATION) at 09:12

## 2017-11-24 RX ADMIN — METOPROLOL SUCCINATE SCH MG: 100 TABLET, EXTENDED RELEASE ORAL at 09:16

## 2017-11-24 RX ADMIN — OXYCODONE HYDROCHLORIDE AND ACETAMINOPHEN PRN TAB: 5; 325 TABLET ORAL at 08:38

## 2017-11-24 RX ADMIN — OXYCODONE HYDROCHLORIDE AND ACETAMINOPHEN PRN TAB: 5; 325 TABLET ORAL at 21:09

## 2017-11-24 RX ADMIN — TAZOBACTAM SODIUM AND PIPERACILLIN SODIUM SCH MLS/HR: 250; 2 INJECTION, SOLUTION INTRAVENOUS at 06:03

## 2017-11-24 NOTE — CP.PCM.CON
History of Present Illness





- History of Present Illness


History of Present Illness: 








67 year old female  was brought to the ED via EMS with complaints of chest pain

, fever, sweating, and full body aches beginning earlier today while completing 

hemodialysis. Patient is also complaining of large mass to left breast that has 

been followed by PMD. Patient was seen in [facility] on 11/02/2017 for 

pain to legs and arms and was diagnoses with pathological fractures to 

bilateral lower extremities and right upper extremities. Patient was seen in an 

ED on 11/3/2017 with similar complaints of total body pain. Patient is 

currently in a rehabilitation center for lower extremity and right upper 

extremity. Patient reports she has had mammograms regarding the breast mass 

with no answers. Patient denies injury, trauma, weakness, numbness, shortness 

of breath, or other complaints a this time.   





ID COINSULTED FOR + BLOOD CULTURES 








PMH:


ESRD


FAILED RENAL TRANSPLANT


SEVERE SECONDARY HYPERPARATHYROIDISM


HTN


COPD


CARDIOMYOPATHY





PSH:


RENAL TRANSPLANT


AV FISTULA


REPAIR OF FRACTURES- LEs, UEs





Review of Systems





- Review of Systems


All systems: reviewed and no additional remarkable complaints except





- Constitutional


Constitutional: As Per HPI





- EENT


Eyes: absent: As Per HPI, Blind Spots, Blurred Vision, Change in Vision, 

Decreased Night Vision, Diplopia, Discharge, Dry Eye, Exophthalmos, Floaters, 

Irritation, Itchy Eyes, Loss of Peripheral Vision, Pain, Photophobia, Requires 

Corrective Lenses, Sees Flashes, Spots in Vision, Tunnel Vision, Other Visual 

Disturbances, Loss of Vision, Other


Ears: absent: As Per HPI, Decreased Hearing, Ear Discharge, Ear Pain, Tinnitus, 

Abnormal Hearing, Disequilibrium, Dizziness, Other


Nose/Mouth/Throat: absent: As Per HPI, Epistaxis, Nasal Congestion, Nasal 

Discharge, Nasal Obstruction, Nasal Trauma, Nose Pain, Post Nasal Drip, Sinus 

Pain, Sinus Pressure, Bleeding Gums, Change in Voice, Dental Pain, Dry Mouth, 

Dysphagia, Halitosis, Hoarsness, Lip Swelling, Mouth Lesions, Mouth Pain, 

Odynophagia, Sore Throat, Throat Swelling, Tongue Swelling, Facial Pain, Neck 

Pain, Neck Mass, Other





- Breasts


Breasts: absent: As Per HPI, Change in Shape, Mass, Pain, Nipple Discharge, 

Nipple Inversion, Skin Changes, Swelling, Other





- Cardiovascular


Cardiovascular: absent: As Per HPI, Acrocyanosis, Chest Pain, Chest Pain at Rest

, Chest Pain with Activity, Claudication, Diaphoresis, Dyspnea, Dyspnea on 

Exertion, Edema, Irregular Heart Rhythm, Pain Radiating to Arm/Neck/Jaw, Leg 

Edema, Leg Ulcers, Lightheadedness, Orthopnea, Palpitations, Paroxysmal 

Nocturnal Dyspnea, Pedal Edema, Radiating Pain, Rapid Heart Rate, Slow Heart 

Rate, Syncope, Other





- Respiratory


Respiratory: absent: As Per HPI, Cough, Dyspnea, Hemoptysis, Dyspnea on Exertion

, Wheezing, Snoring, Stridor, Pain on Inspiration, Chest Congestion, Excessive 

Mucous Production, Change in Mucous Color, Pain with Coughing, Other





- Gastrointestinal


Gastrointestinal: absent: As Per HPI, Abdominal Pain, Belching, Bloating, 

Change in Bowel Habits, Change in Stool Character, Coffee Ground Emesis, 

Constipation, Cramping, Diarrhea, Dyspepsia, Dysphagia, Early Satiety, 

Excessive Flatus, Fecal Incontinence, Heartburn, Hematemesis, Hematochezia, 

Loose Stools, Melena, Nausea, Odynophagia, Temesmus, Vomiting, Other





- Genitourinary


Genitourinary: As Per HPI





- Reproductive: Female


Reproductive:Female: absent: As Per HPI, Amenorrhea, Amenorrhea/Birth Control, 

Currently Menstual, Cycle <21 Days, Cycle >35 Days, Cycle Variable, Menses 1-7 

Days, Menses >/= 8 Days, Menses Variable, Cycle > 4 Weeks Between, No Menses 

for 6 Months, Heavy Menses, Light Menses, Normal Menses, Spotting Between Cycles

, S/P Hysterectomy, Menopausal, Post Menopausal, Premenarche, Abnormal Vaginal 

Bleeding, Dysmenorrhea, Dyspareunia, Genital Lesions, Genital Pruritis, Pelvic 

Pain, Prolapse Symptoms, Sexual Dysfunction, Vaginal Discharge, Vaginal Dryness

, Vaginal Odor, Vaginal Pruritis, Other





- Menstruation


Menstruation: absent: As Per HPI, Amenorrhea, Amenorrhea/Birth Control, 

Currently Menstual, Cycle <21 Days, Cycle >35 Days, Cycle Variable, Menses 1-7 

Days, Menses >/= 8 Days, Menses Variable, Cycle > 4 Weeks Between, No Menses 

for 6 Months, Heavy Menses, Light Menses, Normal Menses, Spotting Between Cycles

, S/P Hysterectomy, Menopausal, Post Menopausal, Premenarche, Abnormal Vaginal 

Bleeding, Dysmenorrhea, Other





- Musculoskeletal


Musculoskeletal: As Per HPI





- Integumentary


Integumentary: absent: As Per HPI, Acne, Alopecia, Bleeding Lesions, Change in 

Hair, Change in Nails, Change in Pigmentation, Changing Lesions, Dry Skin, 

Erythema, Furuncle, Hirsutism, Lesions, New Lesions, Non-Healing Lesions, 

Photosensitivity, Pruritus, Rash, Skin Pain, Skin Ulcer, Sores, Striae, Swelling

, Unusual Bruising, Wounds, Jaundice, Other





- Neurological


Neurological: absent: As Per HPI, Abnormal Gait, Abnormal Hearing, Abnormal 

Movements, Abnormal Speech, Behavioral Changes, Burning Sensations, Confusion, 

Convulsions, Disequilibrium, Dizziness, Numbness, Focal Weakness, Frequent Falls

, Headaches, Lack of Coordination, Loss of Vision, Memory Loss, Paresthesias, 

Radicular Pain, Restless Legs, Sensory Deficit, Syncope, Tingling, Tremor, 

Vertigo, Weakness, Other Visual Disturbances, Other





- Psychiatric


Psychiatric: absent: As Per HPI, Abnormal Sleep Pattern, Anhedonia, Anxiety, 

Auditory Hallucinations, Behavioral Changes, Change in Appetite, Change in 

Libido, Confusion, Depression, Difficulty Concentrating, Hallucinations, 

Homicidal Ideation, Hopelessness, Irritability, Memory Loss, Mood Swings, Panic 

Attacks, Paranoia, Suicidal Ideation, Visual Hallucinations, Tactile 

Hallucinations, Other





- Endocrine


Endocrine: absent: As Per HPI, Change in Body Appearance, Change in Libido, 

Cold Intolorance, Deepening of Voice, Excessive Sweating, Fatigue, Flushing, 

Heat Intolorance, Increase in Ring/Shoe/Hat Size, Palpitations, Polydipsia, 

Polyphagia, Polyuria, Other





- Hematologic/Lymphatic


Hematologic: absent: As Per HPI, Easy Bleeding, Easy Bruising, Lymphadenopathy, 

Other





Past Patient History





- Infectious Disease


Hx of Infectious Diseases: None





- Past Medical History & Family History


Past Medical History?: Yes





- Past Social History


Smoking Status: Never Smoked


Chewing Tobacco Use: No


Cigar Use: No


Alcohol: None


Drugs: Denies


Home Situation {Lives}: Nursing Home





- CARDIAC


Hx Cardiac Disorders: Yes


Hx Congestive Heart Failure: Yes


Hx Hypertension: Yes





- PULMONARY


Hx Respiratory Disorders: No





- NEUROLOGICAL


HX Cerebrovascular Accident: Yes (TIA)





- HEENT


Hx HEENT Problems: No





- RENAL


Hx Renal Failure: Yes (ESRD, CKD)





- ENDOCRINE/METABOLIC


Hx Endocrine Disorders: No





- HEMATOLOGICAL/ONCOLOGICAL


Hx Blood Disorders: Yes


Hx Blood Transfusions: Yes


Hx Blood Transfusion Reaction: Yes


Hx Shingles: Yes (right arm)





- INTEGUMENTARY


Hx Dermatological Problems: No





- MUSCULOSKELETAL/RHEUMATOLOGICAL


Hx Arthritis: Yes (OA)





- GASTROINTESTINAL


Hx Diverticulitis: Yes


Hx Pancreatitis: Yes





- GENITOURINARY/GYNECOLOGICAL


Hx Genitourinary Disorders: No


Other/Comment: DIALYSIS PATIENT





- PSYCHIATRIC


Hx Anxiety: Yes


Hx Substance Use: No





- SURGICAL HISTORY


Hx Cholecystectomy: Yes





- ANESTHESIA


Hx Anesthesia: Yes


Hx Anesthesia Reactions: No


Hx Malignant Hyperthermia: No





Meds


Allergies/Adverse Reactions: 


 Allergies











Allergy/AdvReac Type Severity Reaction Status Date / Time


 


aspirin Allergy  NAUSEA Verified 11/21/17 17:14


 


lactose Allergy  NAUSEA Verified 11/21/17 17:14














- Medications


Medications: 


 Current Medications





Acetaminophen (Tylenol 325mg Tab)  650 mg PO Q6 PRN


   PRN Reason: Fever >100.4 F


Albuterol/Ipratropium (Duoneb 3 Mg/0.5 Mg (3 Ml) Ud)  3 ml INH RTID [facility]


   Last Admin: 11/24/17 14:04 Dose:  3 ml


Calcium/Vitamin D (Oyster Shell Calcium/Vitamin D 500 Mg-200 Iu)  1 tab PO 

DAILY [facility]


   Last Admin: 11/24/17 09:16 Dose:  1 tab


Clonidine HCl (Catapres)  0.1 mg PO HS [facility]


   Last Admin: 11/23/17 22:01 Dose:  0.1 mg


Docusate Sodium (Colace)  100 mg PO BID PRN


   PRN Reason: Constipation


   Last Admin: 11/24/17 09:16 Dose:  100 mg


Epoetin Jeff (Procrit)  10,000 unit IV TTS [facility]


   Last Admin: 11/23/17 13:24 Dose:  10,000 unit


Heparin Sodium (Porcine) (Heparin)  5,000 units SC Q8 [facility]


   Last Admin: 11/24/17 14:12 Dose:  5,000 units


Piperacillin Sod/Tazobactam Sod (Zosyn 2.25 Gm Iv Premix)  2.25 gm in 50 mls @ 

100 mls/hr IVPB Q8 [facility]


   Last Admin: 11/24/17 14:11 Dose:  100 mls/hr


Azithromycin 500 mg/ Sodium (Chloride)  250 mls @ 250 mls/hr IVPB Q24H [facility]


   Last Admin: 11/24/17 16:17 Dose:  250 mls/hr


Metoprolol Succinate (Toprol Xl)  100 mg PO DAILY [facility]


   Last Admin: 11/24/17 09:16 Dose:  100 mg


Oxycodone/Acetaminophen (Percocet 5/325 Mg Tab)  1 tab PO Q8 PRN


   PRN Reason: Pain, moderate (4-7)


   Stop: 11/25/17 06:01


   Last Admin: 11/23/17 15:23 Dose:  1 tab


Oxycodone/Acetaminophen (Percocet 5/325 Mg Tab)  1 tab PO Q4H PRN


   PRN Reason: Pain, severe (8-10)


   Stop: 11/24/17 22:43


   Last Admin: 11/24/17 08:38 Dose:  1 tab


Sertraline HCl (Zoloft)  100 mg PO DAILY [facility]


   Last Admin: 11/24/17 14:17 Dose:  100 mg


Sodium Polystyrene Sulfonate (Kayexalate Susp)  15 gm PO ONCE PRN


   PRN Reason: K LEVEL ABOVE 5.5











Physical Exam





- Constitutional


Appears: Non-toxic, Chronically Ill





- Head Exam


Head Exam: NORMOCEPHALIC





- Eye Exam


Eye Exam: PERRL





- ENT Exam


ENT Exam: Mucous Membranes Dry, Normal External Ear Exam





- Neck Exam


Neck exam: Negative for: Lymphadenopathy





- Respiratory Exam


Respiratory Exam: Decreased Breath Sounds, Prolonged Expiratory Phase, Rhonchi, 

Wheezes





- Cardiovascular Exam


Cardiovascular Exam: REGULAR RHYTHM





- GI/Abdominal Exam


GI & Abdominal Exam: Diminished Bowel Sounds, Soft.  absent: Tenderness





- Rectal Exam


Rectal Exam: Deferred





-  Exam


 Exam: NORMAL INSPECTION





- Extremities Exam


Extremities exam: Negative for: pedal edema





- Back Exam


Back exam: absent: CVA tenderness (L), CVA tenderness (R)





- Neurological Exam


Neurological exam: Alert, CN II-XII Intact, Oriented x3, Reflexes Normal





- Psychiatric Exam


Psychiatric exam: Depressed





- Skin


Skin Exam: Dry, Intact





Results





- Vital Signs


Recent Vital Signs: 


 Last Vital Signs











Temp  97.9 F   11/24/17 17:01


 


Pulse  78   11/24/17 17:01


 


Resp  20   11/24/17 17:01


 


BP  140/58 L  11/24/17 17:01


 


Pulse Ox  94 L  11/24/17 17:01














- Labs


Result Diagrams: 


 11/23/17 10:37





 11/23/17 10:37





Assessment & Plan


(1) Bacteremia due to Gram-positive bacteria


Status: Acute   





(2) Failed kidney transplant


Status: Acute   





(3) Fever


Status: Acute   





(4) Pneumonia


Status: Acute   





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


Status: Chronic   





- Assessment and Plan (Free Text)


Assessment: 





CONSIDER ECHO TO R/O ENDOICARDITIS


CONT IV ANTIBIOTICS

## 2017-11-24 NOTE — CP.PCM.PN
Subjective





- Date & Time of Evaluation


Date of Evaluation: 11/24/17


Time of Evaluation: 09:27





- Subjective


Subjective: 





s/p dialysis 11/23- UF 2500ml


+ blood cultures- B strep- on IV ABs


feels better today


might have cast on left LE as per ortho





Objective





- Vital Signs/Intake and Output


Vital Signs (last 24 hours): 


 











Temp Pulse Resp BP Pulse Ox


 


 98 F   78   20   137/70   99 


 


 11/24/17 00:00  11/24/17 00:00  11/24/17 00:00  11/24/17 00:00  11/24/17 00:00








Intake and Output: 


 











 11/24/17 11/24/17





 06:59 18:59


 


Intake Total 320 


 


Balance 320 














- Medications


Medications: 


 Current Medications





Acetaminophen (Tylenol 325mg Tab)  650 mg PO Q6 PRN


   PRN Reason: Fever >100.4 F


Albuterol/Ipratropium (Duoneb 3 Mg/0.5 Mg (3 Ml) Ud)  3 ml INH RTID [facility]


   Last Admin: 11/24/17 09:12 Dose:  3 ml


Calcium/Vitamin D (Oyster Shell Calcium/Vitamin D 500 Mg-200 Iu)  1 tab PO 

DAILY [facility]


   Last Admin: 11/24/17 09:16 Dose:  1 tab


Clonidine HCl (Catapres)  0.1 mg PO HS [facility]


   Last Admin: 11/23/17 22:01 Dose:  0.1 mg


Docusate Sodium (Colace)  100 mg PO BID PRN


   PRN Reason: Constipation


   Last Admin: 11/24/17 09:16 Dose:  100 mg


Epoetin Jeff (Procrit)  10,000 unit IV TTS [facility]


   Last Admin: 11/23/17 13:24 Dose:  10,000 unit


Heparin Sodium (Porcine) (Heparin)  5,000 units SC Q8 [facility]


   Last Admin: 11/24/17 06:01 Dose:  5,000 units


Piperacillin Sod/Tazobactam Sod (Zosyn 2.25 Gm Iv Premix)  2.25 gm in 50 mls @ 

100 mls/hr IVPB Q8 [facility]


   Last Admin: 11/24/17 06:03 Dose:  100 mls/hr


Azithromycin 500 mg/ Sodium (Chloride)  250 mls @ 250 mls/hr IVPB Q24H [facility]


   Last Admin: 11/23/17 16:30 Dose:  250 mls/hr


Metoprolol Succinate (Toprol Xl)  100 mg PO DAILY [facility]


   Last Admin: 11/24/17 09:16 Dose:  100 mg


Oxycodone/Acetaminophen (Percocet 5/325 Mg Tab)  1 tab PO Q8 PRN


   PRN Reason: Pain, moderate (4-7)


   Stop: 11/25/17 06:01


   Last Admin: 11/23/17 15:23 Dose:  1 tab


Oxycodone/Acetaminophen (Percocet 5/325 Mg Tab)  1 tab PO Q4H PRN


   PRN Reason: Pain, severe (8-10)


   Stop: 11/24/17 22:43


   Last Admin: 11/24/17 08:38 Dose:  1 tab


Sertraline HCl (Zoloft)  100 mg PO DAILY [facility]


   Last Admin: 11/23/17 09:57 Dose:  Not Given


Sodium Polystyrene Sulfonate (Kayexalate Susp)  15 gm PO ONCE PRN


   PRN Reason: K LEVEL ABOVE 5.5











- Labs


Labs: 


 





 11/23/17 10:37 





 11/23/17 10:37 











- Constitutional


Appears: No Acute Distress, Chronically Ill





- Head Exam


Head Exam: ATRAUMATIC, NORMAL INSPECTION





- Eye Exam


Eye Exam: EOMI, Normal appearance





- Neck Exam


Neck Exam: Normal Inspection.  absent: Tenderness





- Respiratory Exam


Respiratory Exam: Clear to Ausculation Bilateral, NORMAL BREATHING PATTERN





- Cardiovascular Exam


Cardiovascular Exam: REGULAR RHYTHM, +S1





- GI/Abdominal Exam


GI & Abdominal Exam: Soft.  absent: Tenderness





- Extremities Exam


Extremities Exam: Normal Inspection.  absent: Tenderness





- Neurological Exam


Neurological Exam: Alert, CN II-XII Intact





- Skin


Skin Exam: Dry, Warm





Assessment and Plan


(1) Failed kidney transplant


Status: Acute   





(2) CHF (congestive heart failure)


Status: Acute   





(3) End stage renal disease


Status: Acute   





(4) HTN (hypertension)


Status: Acute   





(5) Closed fracture of distal end of left fibula and tibia


Status: Chronic   





(6) Bacteremia due to Gram-positive bacteria


Status: Acute   





- Assessment and Plan (Free Text)


Plan: 





Continue IV ABs as per medicine


dialysis MWF - same UF goal


possible new cast left LE

## 2017-11-24 NOTE — CP.PCM.PN
Subjective





- Date & Time of Evaluation


Date of Evaluation: 11/24/17


Time of Evaluation: 13:56





- Subjective


Subjective: 





Patient denies pain. No new complaints.





Objective





- Vital Signs/Intake and Output


Vital Signs (last 24 hours): 


 











Temp Pulse Resp BP Pulse Ox


 


 98 F   78   20   137/70   99 


 


 11/24/17 00:00  11/24/17 00:00  11/24/17 00:00  11/24/17 00:00  11/24/17 00:00








Intake and Output: 


 











 11/24/17 11/24/17





 06:59 18:59


 


Intake Total 320 


 


Balance 320 














- Medications


Medications: 


 Current Medications





Acetaminophen (Tylenol 325mg Tab)  650 mg PO Q6 PRN


   PRN Reason: Fever >100.4 F


Albuterol/Ipratropium (Duoneb 3 Mg/0.5 Mg (3 Ml) Ud)  3 ml INH RTID [facility]


   Last Admin: 11/24/17 09:12 Dose:  3 ml


Calcium/Vitamin D (Oyster Shell Calcium/Vitamin D 500 Mg-200 Iu)  1 tab PO 

DAILY [facility]


   Last Admin: 11/24/17 09:16 Dose:  1 tab


Clonidine HCl (Catapres)  0.1 mg PO HS [facility]


   Last Admin: 11/23/17 22:01 Dose:  0.1 mg


Docusate Sodium (Colace)  100 mg PO BID PRN


   PRN Reason: Constipation


   Last Admin: 11/24/17 09:16 Dose:  100 mg


Epoetin Jeff (Procrit)  10,000 unit IV TTS [facility]


   Last Admin: 11/23/17 13:24 Dose:  10,000 unit


Heparin Sodium (Porcine) (Heparin)  5,000 units SC Q8 [facility]


   Last Admin: 11/24/17 06:01 Dose:  5,000 units


Piperacillin Sod/Tazobactam Sod (Zosyn 2.25 Gm Iv Premix)  2.25 gm in 50 mls @ 

100 mls/hr IVPB Q8 [facility]


   Last Admin: 11/24/17 06:03 Dose:  100 mls/hr


Azithromycin 500 mg/ Sodium (Chloride)  250 mls @ 250 mls/hr IVPB Q24H [facility]


   Last Admin: 11/23/17 16:30 Dose:  250 mls/hr


Metoprolol Succinate (Toprol Xl)  100 mg PO DAILY [facility]


   Last Admin: 11/24/17 09:16 Dose:  100 mg


Oxycodone/Acetaminophen (Percocet 5/325 Mg Tab)  1 tab PO Q8 PRN


   PRN Reason: Pain, moderate (4-7)


   Stop: 11/25/17 06:01


   Last Admin: 11/23/17 15:23 Dose:  1 tab


Oxycodone/Acetaminophen (Percocet 5/325 Mg Tab)  1 tab PO Q4H PRN


   PRN Reason: Pain, severe (8-10)


   Stop: 11/24/17 22:43


   Last Admin: 11/24/17 08:38 Dose:  1 tab


Sertraline HCl (Zoloft)  100 mg PO DAILY [facility]


   Last Admin: 11/23/17 09:57 Dose:  Not Given


Sodium Polystyrene Sulfonate (Kayexalate Susp)  15 gm PO ONCE PRN


   PRN Reason: K LEVEL ABOVE 5.5











- Labs


Labs: 


 





 11/23/17 10:37 





 11/23/17 10:37 











- Extremities Exam


Additional comments: 





LLE: Splint removed, skin intact, +DP/PT pulses, calves soft NT neg homans


short leg cast applied, NVID pre and post casting





RUE:patient verbally consented to closed reduction. Advised patient will 

attempt conservative mgmt and reduce radius as well as possible, if fails she 

may need ORIF in future, but with infection +blood cx surgery contraindicated





closed reduction performed, sugar tong splint applied, NVID pre and post 

reduction and splinting. Patient tolerated well. 





Assessment and Plan


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


Assessment & Plan: 








Keep splint dry and intact


I feel reduction is best possible closed with intact ulna


Patient again instructed on importance of close ortho f/u after discharge for 

serial xrays, and that it needs to be monitored for position and healing, and 

that future surgery is still a possibility


sling


NWB


d/w Dr. Coombs, agrees withh above


Status: Chronic   





(2) Closed fracture of shaft of right radius and ulna


Status: Chronic   





(3) Closed fracture of distal end of left fibula and tibia


Assessment & Plan: 


Some noted interim healing


short leg cast applied as some callus already


NWB


VTE proph


d/w Dr. Coombs, agrees with above


f/u 1-2 weeks after discharge, call for appointment


Status: Chronic   





Procedures


Attestation:: I certify that I have explained the specified Operation(s) or 

Procedure(s), risks, benefits and reasonable alternatives to the Patient and/or 

other person responsible. The opportunity was given to ask questions and all 

questions answered





- Orthopedic Fracture Reduction


  ** Fracture #1


Consent Obtained: verbal consent


Time Out Performed: Yes


Side: right


Fracture Reduction Location: radius


Analgesia: none


Technique: direct manipulation


Post Reduction X-rays Demonstrate: acceptable reduction


Post-Reduction Neuro Exam: intact


Post-Reduction Vascular Exam: intact


Splint Applied: Yes


Patient Tolerated Procedure: well





- Orthopedic Splinting/Casting


  ** Injury #1


Side: right


Upper Extremity Injury Location: forearm


Upper Extremity Immobilizer: sugar tong splint





  ** Injury #2


Side: left


Lower Extremity Injury Location: ankle


Additional comments: 





short leg cast

## 2017-11-24 NOTE — CP.PCM.PN
Subjective





- Date & Time of Evaluation


Date of Evaluation: 11/24/17


Time of Evaluation: 11:34





- Subjective


Subjective: 





fever bacreamia mutlipbe fx upper and lower ext sob hi teriponine





Objective





- Vital Signs/Intake and Output


Vital Signs (last 24 hours): 


 











Temp Pulse Resp BP Pulse Ox


 


 98 F   78   20   137/70   99 


 


 11/24/17 00:00  11/24/17 00:00  11/24/17 00:00  11/24/17 00:00  11/24/17 00:00








Intake and Output: 


 











 11/24/17 11/24/17





 06:59 18:59


 


Intake Total 320 


 


Balance 320 














- Medications


Medications: 


 Current Medications





Acetaminophen (Tylenol 325mg Tab)  650 mg PO Q6 PRN


   PRN Reason: Fever >100.4 F


Albuterol/Ipratropium (Duoneb 3 Mg/0.5 Mg (3 Ml) Ud)  3 ml INH RTID [facility]


   Last Admin: 11/24/17 09:12 Dose:  3 ml


Calcium/Vitamin D (Oyster Shell Calcium/Vitamin D 500 Mg-200 Iu)  1 tab PO 

DAILY [facility]


   Last Admin: 11/24/17 09:16 Dose:  1 tab


Clonidine HCl (Catapres)  0.1 mg PO HS [facility]


   Last Admin: 11/23/17 22:01 Dose:  0.1 mg


Docusate Sodium (Colace)  100 mg PO BID PRN


   PRN Reason: Constipation


   Last Admin: 11/24/17 09:16 Dose:  100 mg


Epoetin Jeff (Procrit)  10,000 unit IV TTS [facility]


   Last Admin: 11/23/17 13:24 Dose:  10,000 unit


Heparin Sodium (Porcine) (Heparin)  5,000 units SC Q8 [facility]


   Last Admin: 11/24/17 06:01 Dose:  5,000 units


Piperacillin Sod/Tazobactam Sod (Zosyn 2.25 Gm Iv Premix)  2.25 gm in 50 mls @ 

100 mls/hr IVPB Q8 [facility]


   Last Admin: 11/24/17 06:03 Dose:  100 mls/hr


Azithromycin 500 mg/ Sodium (Chloride)  250 mls @ 250 mls/hr IVPB Q24H [facility]


   Last Admin: 11/23/17 16:30 Dose:  250 mls/hr


Metoprolol Succinate (Toprol Xl)  100 mg PO DAILY [facility]


   Last Admin: 11/24/17 09:16 Dose:  100 mg


Oxycodone/Acetaminophen (Percocet 5/325 Mg Tab)  1 tab PO Q8 PRN


   PRN Reason: Pain, moderate (4-7)


   Stop: 11/25/17 06:01


   Last Admin: 11/23/17 15:23 Dose:  1 tab


Oxycodone/Acetaminophen (Percocet 5/325 Mg Tab)  1 tab PO Q4H PRN


   PRN Reason: Pain, severe (8-10)


   Stop: 11/24/17 22:43


   Last Admin: 11/24/17 08:38 Dose:  1 tab


Sertraline HCl (Zoloft)  100 mg PO DAILY [facility]


   Last Admin: 11/23/17 09:57 Dose:  Not Given


Sodium Polystyrene Sulfonate (Kayexalate Susp)  15 gm PO ONCE PRN


   PRN Reason: K LEVEL ABOVE 5.5











- Labs


Labs: 


 





 11/23/17 10:37 





 11/23/17 10:37 











- Constitutional


Appears: Non-toxic, In Acute Distress





- Head Exam


Head Exam: ATRAUMATIC





- Eye Exam


Eye Exam: Normal appearance





- ENT Exam


ENT Exam: Mucous Membranes Moist





- Neck Exam


Neck Exam: Full ROM





- Respiratory Exam


Respiratory Exam: Decreased Breath Sounds





- Cardiovascular Exam


Cardiovascular Exam: REGULAR RHYTHM





- GI/Abdominal Exam


GI & Abdominal Exam: Normal Bowel Sounds





- Rectal Exam


Rectal Exam: NORMAL INSPECTION





-  Exam


 Exam: NORMAL INSPECTION





- Back Exam


Back Exam: NORMAL INSPECTION





- Neurological Exam


Neurological Exam: Awake, Oriented x3


Additional comments: 





has casts on upper and lower ext





- Psychiatric Exam


Psychiatric exam: Normal Affect





- Skin


Skin Exam: Normal Color





Assessment and Plan





- Assessment and Plan (Free Text)


Assessment: 





bacteamia ESRF CHF MULTIPE FXS


Plan: 





AS PER ORDERS

## 2017-11-24 NOTE — RAD
PROCEDURE:  Left Ankle Radiographs.



HISTORY:

s/p cast application  



COMPARISON:

Left ankle radiographs 11/22/2017.



FINDINGS:



BONES:

Diffuse osteopenia suggests osteoporosis.  Cast obscures fine bony 

detail more so than usual because of osteopenia. Fractures at the 

distal fibular and tibial diaphyses are again identified an 

intermediate stage of healing with limited callus formation again 

evident. No significant interval change is appreciated. 



JOINTS:

Degenerative sclerosis of the tibiotalar joint articular cortices is 

again appreciated mildly. Ankle mortise maintained. Talar dome intact



SOFT TISSUES:

Normal. 



OTHER FINDINGS:

None.



IMPRESSION:

Distal tibial and fibular fractures are again seen abdomen 

intermediate stage of healing with no interval fracture appreciated.  

No dislocation. Diffuse osteopenia suggests osteoporosis with 

overlying cast obscuring fine bony and soft-tissue detail.

## 2017-11-25 RX ADMIN — OXYCODONE HYDROCHLORIDE AND ACETAMINOPHEN PRN TAB: 5; 325 TABLET ORAL at 05:41

## 2017-11-25 RX ADMIN — IPRATROPIUM BROMIDE AND ALBUTEROL SULFATE SCH ML: .5; 3 SOLUTION RESPIRATORY (INHALATION) at 19:20

## 2017-11-25 RX ADMIN — TAZOBACTAM SODIUM AND PIPERACILLIN SODIUM SCH MLS/HR: 250; 2 INJECTION, SOLUTION INTRAVENOUS at 05:32

## 2017-11-25 RX ADMIN — ERYTHROPOIETIN SCH UNIT: 10000 INJECTION, SOLUTION INTRAVENOUS; SUBCUTANEOUS at 09:59

## 2017-11-25 RX ADMIN — IPRATROPIUM BROMIDE AND ALBUTEROL SULFATE SCH ML: .5; 3 SOLUTION RESPIRATORY (INHALATION) at 13:18

## 2017-11-25 RX ADMIN — CALCIUM CARBONATE-VITAMIN D TAB 500 MG-200 UNIT SCH TAB: 500-200 TAB at 14:05

## 2017-11-25 RX ADMIN — IPRATROPIUM BROMIDE AND ALBUTEROL SULFATE SCH ML: .5; 3 SOLUTION RESPIRATORY (INHALATION) at 07:41

## 2017-11-25 RX ADMIN — METOPROLOL SUCCINATE SCH MG: 100 TABLET, EXTENDED RELEASE ORAL at 14:04

## 2017-11-25 RX ADMIN — TAZOBACTAM SODIUM AND PIPERACILLIN SODIUM SCH MLS/HR: 250; 2 INJECTION, SOLUTION INTRAVENOUS at 22:02

## 2017-11-25 RX ADMIN — TAZOBACTAM SODIUM AND PIPERACILLIN SODIUM SCH MLS/HR: 250; 2 INJECTION, SOLUTION INTRAVENOUS at 14:06

## 2017-11-25 NOTE — CP.PCM.PN
Subjective





- Date & Time of Evaluation


Date of Evaluation: 11/25/17


Time of Evaluation: 10:28





- Subjective


Subjective: 





seen in dialysis


had cast in left leg yesterday


dressing changed over right arm


no SOB


afebrile 


UF 2.5 L 





ROS- as per HPI, other than that 10 point ROS negative 





Objective





- Vital Signs/Intake and Output


Vital Signs (last 24 hours): 


 











Temp Pulse Resp BP Pulse Ox


 


 98.6 F   79   24   119/55 L  99 


 


 11/25/17 08:55  11/25/17 08:55  11/25/17 08:55  11/25/17 09:55  11/25/17 08:55








Intake and Output: 


 











 11/25/17 11/25/17





 06:59 18:59


 


Intake Total 170 


 


Balance 170 














- Medications


Medications: 


 Current Medications





Acetaminophen (Tylenol 325mg Tab)  650 mg PO Q6 PRN


   PRN Reason: Fever >100.4 F


Albuterol/Ipratropium (Duoneb 3 Mg/0.5 Mg (3 Ml) Ud)  3 ml INH RTID [facility]


   Last Admin: 11/25/17 07:41 Dose:  3 ml


Calcium/Vitamin D (Oyster Shell Calcium/Vitamin D 500 Mg-200 Iu)  1 tab PO 

DAILY [facility]


   Last Admin: 11/24/17 09:16 Dose:  1 tab


Clonidine HCl (Catapres)  0.1 mg PO HS [facility]


   Last Admin: 11/24/17 21:07 Dose:  0.1 mg


Docusate Sodium (Colace)  100 mg PO BID PRN


   PRN Reason: Constipation


   Last Admin: 11/24/17 09:16 Dose:  100 mg


Epoetin Jeff (Procrit)  10,000 unit IV TTS [facility]


   Last Admin: 11/25/17 09:59 Dose:  10,000 unit


Heparin Sodium (Porcine) (Heparin)  5,000 units SC Q8 [facility]


   Last Admin: 11/25/17 06:13 Dose:  5,000 units


Piperacillin Sod/Tazobactam Sod (Zosyn 2.25 Gm Iv Premix)  2.25 gm in 50 mls @ 

100 mls/hr IVPB Q8 [facility]


   Last Admin: 11/25/17 05:32 Dose:  100 mls/hr


Azithromycin 500 mg/ Sodium (Chloride)  250 mls @ 250 mls/hr IVPB Q24H [facility]


   Last Admin: 11/24/17 16:17 Dose:  250 mls/hr


Metoprolol Succinate (Toprol Xl)  100 mg PO DAILY [facility]


   Last Admin: 11/24/17 09:16 Dose:  100 mg


Sertraline HCl (Zoloft)  100 mg PO DAILY [facility]


   Last Admin: 11/24/17 14:17 Dose:  100 mg


Sodium Polystyrene Sulfonate (Kayexalate Susp)  15 gm PO ONCE PRN


   PRN Reason: K LEVEL ABOVE 5.5











- Labs


Labs: 


 





 11/23/17 10:37 





 11/23/17 10:37 











- Constitutional


Appears: Well, Non-toxic





- Head Exam


Head Exam: ATRAUMATIC, NORMOCEPHALIC





- Eye Exam


Eye Exam: EOMI, Normal appearance





- Neck Exam


Neck Exam: Full ROM





- Respiratory Exam


Respiratory Exam: Clear to Ausculation Bilateral.  absent: Rhonchi, Wheezes





- Cardiovascular Exam


Cardiovascular Exam: REGULAR RHYTHM, +S1, +S2





- GI/Abdominal Exam


GI & Abdominal Exam: Soft.  absent: Distended, Tenderness





- Extremities Exam


Extremities Exam: absent: Pedal Edema


Additional comments: 





cast left leg 





- Neurological Exam


Neurological Exam: Alert, Awake, Oriented x3





- Psychiatric Exam


Psychiatric exam: Normal Affect, Normal Mood





- Skin


Skin Exam: Normal Color, Warm





Assessment and Plan


(1) Bacteremia due to Gram-positive bacteria


Status: Acute   





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


Status: Chronic   





(3) CHF (congestive heart failure)


Status: Acute   





(4) ESRD (end stage renal disease) on dialysis


Status: Acute   





(5) HTN (hypertension)


Status: Acute   





(6) Age related osteoporosis


Status: Chronic   





- Assessment and Plan (Free Text)


Plan: 





HD today


BP reasonable


continue ABx for bacteremia

## 2017-11-25 NOTE — CP.PCM.PN
Subjective





- Date & Time of Evaluation


Date of Evaluation: 11/25/17


Time of Evaluation: 10:25





- Subjective


Subjective: 





feels beter no fever this morning having dialysis now





Objective





- Vital Signs/Intake and Output


Vital Signs (last 24 hours): 


 











Temp Pulse Resp BP Pulse Ox


 


 98.6 F   79   24   119/55 L  99 


 


 11/25/17 08:55  11/25/17 08:55  11/25/17 08:55  11/25/17 09:55  11/25/17 08:55








Intake and Output: 


 











 11/25/17 11/25/17





 06:59 18:59


 


Intake Total 170 


 


Balance 170 














- Medications


Medications: 


 Current Medications





Acetaminophen (Tylenol 325mg Tab)  650 mg PO Q6 PRN


   PRN Reason: Fever >100.4 F


Albuterol/Ipratropium (Duoneb 3 Mg/0.5 Mg (3 Ml) Ud)  3 ml INH RTID [facility]


   Last Admin: 11/25/17 07:41 Dose:  3 ml


Calcium/Vitamin D (Oyster Shell Calcium/Vitamin D 500 Mg-200 Iu)  1 tab PO 

DAILY [facility]


   Last Admin: 11/24/17 09:16 Dose:  1 tab


Clonidine HCl (Catapres)  0.1 mg PO HS [facility]


   Last Admin: 11/24/17 21:07 Dose:  0.1 mg


Docusate Sodium (Colace)  100 mg PO BID PRN


   PRN Reason: Constipation


   Last Admin: 11/24/17 09:16 Dose:  100 mg


Epoetin Jeff (Procrit)  10,000 unit IV TTS [facility]


   Last Admin: 11/25/17 09:59 Dose:  10,000 unit


Heparin Sodium (Porcine) (Heparin)  5,000 units SC Q8 [facility]


   Last Admin: 11/25/17 06:13 Dose:  5,000 units


Piperacillin Sod/Tazobactam Sod (Zosyn 2.25 Gm Iv Premix)  2.25 gm in 50 mls @ 

100 mls/hr IVPB Q8 [facility]


   Last Admin: 11/25/17 05:32 Dose:  100 mls/hr


Azithromycin 500 mg/ Sodium (Chloride)  250 mls @ 250 mls/hr IVPB Q24H [facility]


   Last Admin: 11/24/17 16:17 Dose:  250 mls/hr


Metoprolol Succinate (Toprol Xl)  100 mg PO DAILY [facility]


   Last Admin: 11/24/17 09:16 Dose:  100 mg


Sertraline HCl (Zoloft)  100 mg PO DAILY [facility]


   Last Admin: 11/24/17 14:17 Dose:  100 mg


Sodium Polystyrene Sulfonate (Kayexalate Susp)  15 gm PO ONCE PRN


   PRN Reason: K LEVEL ABOVE 5.5











- Labs


Labs: 


 





 11/23/17 10:37 





 11/23/17 10:37 











- Constitutional


Appears: Non-toxic





- Head Exam


Head Exam: NORMAL INSPECTION





- Eye Exam


Eye Exam: Normal appearance


Pupil Exam: NORMAL ACCOMODATION





- ENT Exam


ENT Exam: Mucous Membranes Moist





- Neck Exam


Neck Exam: Full ROM





- Respiratory Exam


Respiratory Exam: Decreased Breath Sounds, Rales, NORMAL BREATHING PATTERN





- Cardiovascular Exam


Cardiovascular Exam: REGULAR RHYTHM





- Rectal Exam


Rectal Exam: NORMAL INSPECTION





- Extremities Exam


Extremities Exam: Normal Inspection


Additional comments: 





has cast on uper and lower ext





- Back Exam


Back Exam: NORMAL INSPECTION





- Neurological Exam


Neurological Exam: Alert, Oriented x3





- Psychiatric Exam


Psychiatric exam: Normal Mood





- Skin


Skin Exam: Pallor





Assessment and Plan





- Assessment and Plan (Free Text)


Assessment: 





bilateral pnumonia chf bactreamia esrf aneamia


Plan: 





cont as per orders

## 2017-11-26 RX ADMIN — TAZOBACTAM SODIUM AND PIPERACILLIN SODIUM SCH MLS/HR: 250; 2 INJECTION, SOLUTION INTRAVENOUS at 14:26

## 2017-11-26 RX ADMIN — CALCIUM CARBONATE-VITAMIN D TAB 500 MG-200 UNIT SCH TAB: 500-200 TAB at 11:11

## 2017-11-26 RX ADMIN — IPRATROPIUM BROMIDE AND ALBUTEROL SULFATE SCH ML: .5; 3 SOLUTION RESPIRATORY (INHALATION) at 13:25

## 2017-11-26 RX ADMIN — TAZOBACTAM SODIUM AND PIPERACILLIN SODIUM SCH MLS/HR: 250; 2 INJECTION, SOLUTION INTRAVENOUS at 05:09

## 2017-11-26 RX ADMIN — OXYCODONE HYDROCHLORIDE AND ACETAMINOPHEN PRN TAB: 5; 325 TABLET ORAL at 21:51

## 2017-11-26 RX ADMIN — OXYCODONE HYDROCHLORIDE AND ACETAMINOPHEN PRN TAB: 5; 325 TABLET ORAL at 08:37

## 2017-11-26 RX ADMIN — IPRATROPIUM BROMIDE AND ALBUTEROL SULFATE SCH: .5; 3 SOLUTION RESPIRATORY (INHALATION) at 19:45

## 2017-11-26 RX ADMIN — OXYCODONE HYDROCHLORIDE AND ACETAMINOPHEN PRN TAB: 5; 325 TABLET ORAL at 16:35

## 2017-11-26 RX ADMIN — IPRATROPIUM BROMIDE AND ALBUTEROL SULFATE SCH ML: .5; 3 SOLUTION RESPIRATORY (INHALATION) at 07:25

## 2017-11-26 RX ADMIN — TAZOBACTAM SODIUM AND PIPERACILLIN SODIUM SCH MLS/HR: 250; 2 INJECTION, SOLUTION INTRAVENOUS at 21:10

## 2017-11-26 RX ADMIN — METOPROLOL SUCCINATE SCH MG: 100 TABLET, EXTENDED RELEASE ORAL at 11:10

## 2017-11-26 NOTE — CP.PCM.PN
Subjective





- Date & Time of Evaluation


Date of Evaluation: 11/26/17


Time of Evaluation: 12:51





- Subjective


Subjective: 





pt feels weeke 





Objective





- Vital Signs/Intake and Output


Vital Signs (last 24 hours): 


 











Temp Pulse Resp BP Pulse Ox


 


 98.4 F   73   20   158/75 H  97 


 


 11/26/17 07:45  11/26/17 07:45  11/26/17 07:45  11/26/17 07:45  11/26/17 07:45








Intake and Output: 


 











 11/26/17 11/26/17





 06:59 18:59


 


Intake Total 170 


 


Balance 170 














- Medications


Medications: 


 Current Medications





Acetaminophen (Tylenol 325mg Tab)  650 mg PO Q6 PRN


   PRN Reason: Fever >100.4 F


Albuterol/Ipratropium (Duoneb 3 Mg/0.5 Mg (3 Ml) Ud)  3 ml INH RTID [facility]


   Last Admin: 11/26/17 07:25 Dose:  3 ml


Calcium/Vitamin D (Oyster Shell Calcium/Vitamin D 500 Mg-200 Iu)  1 tab PO 

DAILY [facility]


   Last Admin: 11/26/17 11:11 Dose:  1 tab


Clonidine HCl (Catapres)  0.1 mg PO HS [facility]


   Last Admin: 11/25/17 22:03 Dose:  0.1 mg


Docusate Sodium (Colace)  100 mg PO BID PRN


   PRN Reason: Constipation


   Last Admin: 11/24/17 09:16 Dose:  100 mg


Epoetin Jeff (Procrit)  10,000 unit IV TTS [facility]


   Last Admin: 11/25/17 09:59 Dose:  10,000 unit


Heparin Sodium (Porcine) (Heparin)  5,000 units SC Q8 [facility]


   Last Admin: 11/26/17 05:10 Dose:  5,000 units


Piperacillin Sod/Tazobactam Sod (Zosyn 2.25 Gm Iv Premix)  2.25 gm in 50 mls @ 

100 mls/hr IVPB Q8 [facility]


   Last Admin: 11/26/17 05:09 Dose:  100 mls/hr


Azithromycin 500 mg/ Sodium (Chloride)  250 mls @ 250 mls/hr IVPB Q24H [facility]


   Last Admin: 11/25/17 16:04 Dose:  250 mls/hr


Metoprolol Succinate (Toprol Xl)  100 mg PO DAILY [facility]


   Last Admin: 11/26/17 11:10 Dose:  100 mg


Oxycodone/Acetaminophen (Percocet 5/325 Mg Tab)  1 tab PO Q8 [facility]


   PRN Reason: Pain, severe (8-10)


   Stop: 11/28/17 15:58


   Last Admin: 11/26/17 08:37 Dose:  1 tab


Sertraline HCl (Zoloft)  100 mg PO DAILY [facility]


   Last Admin: 11/25/17 14:13 Dose:  100 mg


Sodium Polystyrene Sulfonate (Kayexalate Susp)  15 gm PO ONCE PRN


   PRN Reason: K LEVEL ABOVE 5.5











- Labs


Labs: 


 





 11/23/17 10:37 





 11/23/17 10:37 











- Constitutional


Appears: Non-toxic





- Head Exam


Head Exam: NORMAL INSPECTION





- ENT Exam


ENT Exam: Mucous Membranes Moist





- Neck Exam


Neck Exam: Normal Inspection





- Respiratory Exam


Respiratory Exam: Decreased Breath Sounds





- Cardiovascular Exam


Cardiovascular Exam: REGULAR RHYTHM





- GI/Abdominal Exam


GI & Abdominal Exam: Normal Bowel Sounds





- Rectal Exam


Rectal Exam: NORMAL INSPECTION





- Extremities Exam


Extremities Exam: Normal Inspection





- Back Exam


Back Exam: NORMAL INSPECTION





- Neurological Exam


Neurological Exam: Oriented x3





- Psychiatric Exam


Psychiatric exam: Normal Affect





- Skin


Skin Exam: Normal Color





Assessment and Plan





- Assessment and Plan (Free Text)


Assessment: 





bactreamia mutiple fx ESRF CHF 


Plan: 





cont as per orders

## 2017-11-26 NOTE — CP.PCM.PN
Subjective





- Date & Time of Evaluation


Date of Evaluation: 11/26/17


Time of Evaluation: 09:00





- Subjective


Subjective: 





afeb alert


nad 


IV rx reordered 





Objective





- Vital Signs/Intake and Output


Vital Signs (last 24 hours): 


 











Temp Pulse Resp BP Pulse Ox


 


 97.5 F L  74   20   122/75   95 


 


 11/26/17 15:00  11/26/17 15:00  11/26/17 15:00  11/26/17 15:00  11/26/17 15:00








Intake and Output: 


 











 11/26/17 11/26/17





 06:59 18:59


 


Intake Total 170 450


 


Balance 170 450














- Medications


Medications: 


 Current Medications





Acetaminophen (Tylenol 325mg Tab)  650 mg PO Q6 PRN


   PRN Reason: Fever >100.4 F


Albuterol/Ipratropium (Duoneb 3 Mg/0.5 Mg (3 Ml) Ud)  3 ml INH RTID [facility]


   Last Admin: 11/26/17 13:25 Dose:  3 ml


Calcium/Vitamin D (Oyster Shell Calcium/Vitamin D 500 Mg-200 Iu)  1 tab PO 

DAILY [facility]


   Last Admin: 11/26/17 11:11 Dose:  1 tab


Clonidine HCl (Catapres)  0.1 mg PO HS [facility]


   Last Admin: 11/25/17 22:03 Dose:  0.1 mg


Docusate Sodium (Colace)  100 mg PO BID PRN


   PRN Reason: Constipation


   Last Admin: 11/24/17 09:16 Dose:  100 mg


Epoetin Jeff (Procrit)  10,000 unit IV TTS [facility]


   Last Admin: 11/25/17 09:59 Dose:  10,000 unit


Heparin Sodium (Porcine) (Heparin)  5,000 units SC Q8 [facility]


   Last Admin: 11/26/17 14:26 Dose:  5,000 units


Piperacillin Sod/Tazobactam Sod (Zosyn 2.25 Gm Iv Premix)  2.25 gm in 50 mls @ 

100 mls/hr IVPB Q8 [facility]


   Last Admin: 11/26/17 14:26 Dose:  100 mls/hr


Azithromycin 500 mg/ Sodium (Chloride)  250 mls @ 250 mls/hr IVPB Q24H [facility]


   Last Admin: 11/26/17 16:15 Dose:  250 mls/hr


Metoprolol Succinate (Toprol Xl)  100 mg PO DAILY [facility]


   Last Admin: 11/26/17 11:10 Dose:  100 mg


Oxycodone/Acetaminophen (Percocet 5/325 Mg Tab)  1 tab PO Q8 [facility]


   PRN Reason: Pain, severe (8-10)


   Stop: 11/28/17 15:58


   Last Admin: 11/26/17 16:35 Dose:  1 tab


Sertraline HCl (Zoloft)  100 mg PO DAILY [facility]


   Last Admin: 11/26/17 14:26 Dose:  100 mg


Sodium Polystyrene Sulfonate (Kayexalate Susp)  15 gm PO ONCE PRN


   PRN Reason: K LEVEL ABOVE 5.5











- Labs


Labs: 


 





 11/23/17 10:37 





 11/23/17 10:37 











- Constitutional


Appears: Non-toxic, Chronically Ill





- Head Exam


Head Exam: NORMOCEPHALIC





- Eye Exam


Eye Exam: PERRL





- ENT Exam


ENT Exam: Mucous Membranes Dry





- Neck Exam


Neck Exam: absent: Lymphadenopathy





- Respiratory Exam


Respiratory Exam: Decreased Breath Sounds, Rhonchi





- Cardiovascular Exam


Cardiovascular Exam: REGULAR RHYTHM





- GI/Abdominal Exam


GI & Abdominal Exam: Distended, Soft





- Rectal Exam


Rectal Exam: Deferred





-  Exam


 Exam: NORMAL INSPECTION





- Extremities Exam


Extremities Exam: absent: Pedal Edema





- Back Exam


Back Exam: absent: CVA tenderness (L), CVA tenderness (R)





- Neurological Exam


Neurological Exam: Alert, Awake





Assessment and Plan


(1) Bacteremia due to Gram-positive bacteria


Status: Acute   





(2) Failed kidney transplant


Status: Acute   





(3) Fever


Status: Acute   





(4) Pneumonia


Status: Acute   





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


Status: Chronic

## 2017-11-27 RX ADMIN — METOPROLOL SUCCINATE SCH MG: 100 TABLET, EXTENDED RELEASE ORAL at 10:21

## 2017-11-27 RX ADMIN — TAZOBACTAM SODIUM AND PIPERACILLIN SODIUM SCH MLS/HR: 250; 2 INJECTION, SOLUTION INTRAVENOUS at 05:06

## 2017-11-27 RX ADMIN — TAZOBACTAM SODIUM AND PIPERACILLIN SODIUM SCH MLS/HR: 250; 2 INJECTION, SOLUTION INTRAVENOUS at 21:53

## 2017-11-27 RX ADMIN — CALCIUM CARBONATE-VITAMIN D TAB 500 MG-200 UNIT SCH TAB: 500-200 TAB at 10:21

## 2017-11-27 RX ADMIN — TAZOBACTAM SODIUM AND PIPERACILLIN SODIUM SCH MLS/HR: 250; 2 INJECTION, SOLUTION INTRAVENOUS at 13:24

## 2017-11-27 RX ADMIN — OXYCODONE HYDROCHLORIDE AND ACETAMINOPHEN PRN TAB: 5; 325 TABLET ORAL at 16:35

## 2017-11-27 RX ADMIN — IPRATROPIUM BROMIDE AND ALBUTEROL SULFATE SCH ML: .5; 3 SOLUTION RESPIRATORY (INHALATION) at 07:47

## 2017-11-27 RX ADMIN — OXYCODONE HYDROCHLORIDE AND ACETAMINOPHEN PRN TAB: 5; 325 TABLET ORAL at 22:37

## 2017-11-27 NOTE — CP.PCM.PN
Subjective





- Date & Time of Evaluation


Date of Evaluation: 11/27/17


Time of Evaluation: 16:35





- Subjective


Subjective: 





less pain afebrile





Objective





- Vital Signs/Intake and Output


Vital Signs (last 24 hours): 


 











Temp Pulse Resp BP Pulse Ox


 


 97.5 F L  63   20   144/61   97 


 


 11/27/17 09:27  11/27/17 09:27  11/27/17 09:27  11/27/17 09:27  11/27/17 09:27








Intake and Output: 


 











 11/27/17 11/27/17





 06:59 18:59


 


Intake Total 290 450


 


Balance 290 450














- Medications


Medications: 


 Current Medications





Acetaminophen (Tylenol 325mg Tab)  650 mg PO Q6 PRN


   PRN Reason: Fever >100.4 F


Calcium/Vitamin D (Oyster Shell Calcium/Vitamin D 500 Mg-200 Iu)  1 tab PO 

DAILY [facility]


   Last Admin: 11/27/17 10:21 Dose:  1 tab


Clonidine HCl (Catapres)  0.1 mg PO HS [facility]


   Last Admin: 11/26/17 21:09 Dose:  0.1 mg


Docusate Sodium (Colace)  100 mg PO BID PRN


   PRN Reason: Constipation


   Last Admin: 11/24/17 09:16 Dose:  100 mg


Epoetin Jeff (Procrit)  10,000 unit IV TTS [facility]


   Last Admin: 11/25/17 09:59 Dose:  10,000 unit


Heparin Sodium (Porcine) (Heparin)  5,000 units SC Q8 [facility]


   Last Admin: 11/27/17 13:24 Dose:  5,000 units


Piperacillin Sod/Tazobactam Sod (Zosyn 2.25 Gm Iv Premix)  2.25 gm in 50 mls @ 

100 mls/hr IVPB Q8 [facility]


   Last Admin: 11/27/17 13:24 Dose:  100 mls/hr


Azithromycin 500 mg/ Sodium (Chloride)  250 mls @ 250 mls/hr IVPB Q24H [facility]


   Last Admin: 11/26/17 16:15 Dose:  250 mls/hr


Metoprolol Succinate (Toprol Xl)  100 mg PO DAILY [facility]


   Last Admin: 11/27/17 10:21 Dose:  100 mg


Oxycodone/Acetaminophen (Percocet 5/325 Mg Tab)  1 tab PO Q4H PRN


   PRN Reason: Pain, moderate (4-7)


   Stop: 11/29/17 21:44


   Last Admin: 11/26/17 21:51 Dose:  1 tab


Sertraline HCl (Zoloft)  100 mg PO DAILY [facility]


   Last Admin: 11/27/17 10:24 Dose:  100 mg


Sodium Polystyrene Sulfonate (Kayexalate Susp)  15 gm PO ONCE PRN


   PRN Reason: K LEVEL ABOVE 5.5











- Labs


Labs: 


 





 11/23/17 10:37 





 11/23/17 10:37 











- Constitutional


Appears: Non-toxic





- Head Exam


Head Exam: NORMAL INSPECTION





- Eye Exam


Eye Exam: Normal appearance


Pupil Exam: NORMAL ACCOMODATION





- ENT Exam


ENT Exam: Mucous Membranes Moist





- Neck Exam


Neck Exam: Full ROM





- Respiratory Exam


Respiratory Exam: Decreased Breath Sounds, NORMAL BREATHING PATTERN





- Cardiovascular Exam


Cardiovascular Exam: REGULAR RHYTHM





- Rectal Exam


Rectal Exam: NORMAL INSPECTION





- Extremities Exam


Additional comments: 





cast on





- Back Exam


Back Exam: NORMAL INSPECTION





- Neurological Exam


Neurological Exam: Normal Gait





- Skin


Skin Exam: Normal Color





Assessment and Plan





- Assessment and Plan (Free Text)


Assessment: 





bactreamia pnumonia multiple fxs 


Plan: 





cont same

## 2017-11-27 NOTE — CP.PCM.PN
Subjective





- Date & Time of Evaluation


Date of Evaluation: 11/27/17


Time of Evaluation: 07:00





- Subjective


Subjective: 





repeat blood c/s neg


iv rx renewed


needs min 14 days


await echo








Objective





- Vital Signs/Intake and Output


Vital Signs (last 24 hours): 


 











Temp Pulse Resp BP Pulse Ox


 


 97.5 F L  63   20   144/61   97 


 


 11/27/17 09:27  11/27/17 09:27  11/27/17 09:27  11/27/17 09:27  11/27/17 09:27








Intake and Output: 


 











 11/27/17 11/27/17





 06:59 18:59


 


Intake Total 290 


 


Balance 290 














- Medications


Medications: 


 Current Medications





Acetaminophen (Tylenol 325mg Tab)  650 mg PO Q6 PRN


   PRN Reason: Fever >100.4 F


Calcium/Vitamin D (Oyster Shell Calcium/Vitamin D 500 Mg-200 Iu)  1 tab PO 

DAILY [facility]


   Last Admin: 11/27/17 10:21 Dose:  1 tab


Clonidine HCl (Catapres)  0.1 mg PO HS [facility]


   Last Admin: 11/26/17 21:09 Dose:  0.1 mg


Docusate Sodium (Colace)  100 mg PO BID PRN


   PRN Reason: Constipation


   Last Admin: 11/24/17 09:16 Dose:  100 mg


Epoetin Jeff (Procrit)  10,000 unit IV TTS [facility]


   Last Admin: 11/25/17 09:59 Dose:  10,000 unit


Heparin Sodium (Porcine) (Heparin)  5,000 units SC Q8 [facility]


   Last Admin: 11/27/17 05:05 Dose:  5,000 units


Piperacillin Sod/Tazobactam Sod (Zosyn 2.25 Gm Iv Premix)  2.25 gm in 50 mls @ 

100 mls/hr IVPB Q8 [facility]


   Last Admin: 11/27/17 05:06 Dose:  100 mls/hr


Azithromycin 500 mg/ Sodium (Chloride)  250 mls @ 250 mls/hr IVPB Q24H [facility]


   Last Admin: 11/26/17 16:15 Dose:  250 mls/hr


Metoprolol Succinate (Toprol Xl)  100 mg PO DAILY [facility]


   Last Admin: 11/27/17 10:21 Dose:  100 mg


Oxycodone/Acetaminophen (Percocet 5/325 Mg Tab)  1 tab PO Q4H [facility]


   PRN Reason: Pain, moderate (4-7)


   Stop: 11/29/17 21:44


   Last Admin: 11/26/17 21:51 Dose:  1 tab


Sertraline HCl (Zoloft)  100 mg PO DAILY [facility]


   Last Admin: 11/27/17 10:24 Dose:  100 mg


Sodium Polystyrene Sulfonate (Kayexalate Susp)  15 gm PO ONCE PRN


   PRN Reason: K LEVEL ABOVE 5.5











- Labs


Labs: 


 





 11/23/17 10:37 





 11/23/17 10:37 











- Constitutional


Appears: Non-toxic, Chronically Ill





- Head Exam


Head Exam: NORMOCEPHALIC





- Eye Exam


Eye Exam: PERRL





- ENT Exam


ENT Exam: Mucous Membranes Dry





- Neck Exam


Neck Exam: absent: Lymphadenopathy





- Respiratory Exam


Respiratory Exam: Decreased Breath Sounds





- Cardiovascular Exam


Cardiovascular Exam: REGULAR RHYTHM





- GI/Abdominal Exam


GI & Abdominal Exam: Distended





Assessment and Plan


(1) Bacteremia due to Gram-positive bacteria


Status: Acute   





(2) Failed kidney transplant


Status: Acute   





(3) Fever


Status: Acute   





(4) Pneumonia


Status: Acute   





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


Status: Chronic

## 2017-11-27 NOTE — CP.PCM.PN
Subjective





- Date & Time of Evaluation


Date of Evaluation: 11/27/17


Time of Evaluation: 10:58





- Subjective


Subjective: 





Patient  complaining of some swelling to her right fingers. Patient is not 

using sling currently, and advised patient she needs to use sling and needs to 

elevate hand above elbow at all times. Encouraged ROM fingers as well. No 

complaints about ankle.





Review of Systems





- Review of Systems


All systems: reviewed and no additional remarkable complaints except





- Cardiovascular


Cardiovascular: UNREMARKABLE





- Respiratory


Respiratory: UNREMARKABLE





- Gastrointestinal


Gastrointestinal: UNREMARKABLE





- Musculoskeletal


Musculoskeletal: As Par HPI





- Integumentary


Integumentary: As Per HPI





- Neurological


Neurological: UNREMARKABLE





- Hematologic/Lymphatic


Hematologic: UNREMARKABLE





Objective





- Vital Signs/Intake and Output


Vital Signs (last 24 hours): 


 











Temp Pulse Resp BP Pulse Ox


 


 97.5 F L  63   20   144/61   97 


 


 11/27/17 09:27  11/27/17 09:27  11/27/17 09:27  11/27/17 09:27  11/27/17 09:27








Intake and Output: 


 











 11/27/17 11/27/17





 06:59 18:59


 


Intake Total 290 


 


Balance 290 














- Medications


Medications: 


 Current Medications





Acetaminophen (Tylenol 325mg Tab)  650 mg PO Q6 PRN


   PRN Reason: Fever >100.4 F


Calcium/Vitamin D (Oyster Shell Calcium/Vitamin D 500 Mg-200 Iu)  1 tab PO 

DAILY [facility]


   Last Admin: 11/27/17 10:21 Dose:  1 tab


Clonidine HCl (Catapres)  0.1 mg PO HS [facility]


   Last Admin: 11/26/17 21:09 Dose:  0.1 mg


Docusate Sodium (Colace)  100 mg PO BID PRN


   PRN Reason: Constipation


   Last Admin: 11/24/17 09:16 Dose:  100 mg


Epoetin Jeff (Procrit)  10,000 unit IV TTS [facility]


   Last Admin: 11/25/17 09:59 Dose:  10,000 unit


Heparin Sodium (Porcine) (Heparin)  5,000 units SC Q8 [facility]


   Last Admin: 11/27/17 05:05 Dose:  5,000 units


Piperacillin Sod/Tazobactam Sod (Zosyn 2.25 Gm Iv Premix)  2.25 gm in 50 mls @ 

100 mls/hr IVPB Q8 [facility]


   Last Admin: 11/27/17 05:06 Dose:  100 mls/hr


Azithromycin 500 mg/ Sodium (Chloride)  250 mls @ 250 mls/hr IVPB Q24H [facility]


   Last Admin: 11/26/17 16:15 Dose:  250 mls/hr


Metoprolol Succinate (Toprol Xl)  100 mg PO DAILY [facility]


   Last Admin: 11/27/17 10:21 Dose:  100 mg


Oxycodone/Acetaminophen (Percocet 5/325 Mg Tab)  1 tab PO Q4H PRN


   PRN Reason: Pain, moderate (4-7)


   Stop: 11/29/17 21:44


   Last Admin: 11/26/17 21:51 Dose:  1 tab


Sertraline HCl (Zoloft)  100 mg PO DAILY [facility]


   Last Admin: 11/27/17 10:24 Dose:  100 mg


Sodium Polystyrene Sulfonate (Kayexalate Susp)  15 gm PO ONCE PRN


   PRN Reason: K LEVEL ABOVE 5.5











- Labs


Labs: 


 





 11/23/17 10:37 





 11/23/17 10:37 











- Constitutional


Appears: Well, No Acute Distress





- Head Exam


Head Exam: ATRAUMATIC





- Respiratory Exam


Respiratory Exam: NORMAL BREATHING PATTERN





- Cardiovascular Exam


Additional comments: 





+cap refill right fingers, left toes





- Extremities Exam


Additional comments: 





Right hand: splint intact, well padded, had in dependant position. Hand 

elevated. full ROM fingers encouraged. 


LLE: cast intact. +ROM toes, sensation intact, no swelling





- Neurological Exam


Neurological Exam: Alert, Awake, Oriented x3


Neuro motor strength exam: Right Upper Extremity: 5, Left Lower Extremity: 5





- Psychiatric Exam


Psychiatric exam: Normal Affect, Normal Mood





- Skin


Skin Exam: Dry, Intact, Normal Color, Warm





Assessment and Plan


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


Assessment & Plan: 


continue splint/cast


elevation/ROM for swelling


NWB RUE/LLE


orthopedically stable for d/c 


patient again instructed on importance of close ortho f/u within 1 week of 

discharge


d/w Dr. Coombs, agrees with above


Status: Chronic   





(2) Closed fracture of shaft of right radius and ulna


Status: Chronic   





(3) Closed fracture of distal end of left fibula and tibia


Assessment & Plan: 


see above


Status: Chronic   





Radiology Interpretation





- Radiology Interpretation #2


Interpretation: 





Patient Name / ID : YAYA CLIFTON  / [id number]


Exam Date : 11/24/2017 12:57:09 ( Approved )


Study Comment : 


Sex / Age : F  / 67Y





Creator : Edvin Hatfield MD


Dictator : Edvin Hatfield MD


 : 


Approver : Edvin Hatfield MD


Approver2 : 





Report Date : 11/24/2017 13:44:07


My Comment : 


********************************************************************************

***





PROCEDURE:  Left Ankle Radiographs.





HISTORY:


s/p cast application  





COMPARISON:


Left ankle radiographs 11/22/2017.





FINDINGS:





BONES:


Diffuse osteopenia suggests osteoporosis.  Cast obscures fine bony detail more 

so than usual because of osteopenia. Fractures at the distal fibular and tibial 

diaphyses are again identified an intermediate stage of healing with limited 

callus formation again evident. No significant interval change is appreciated. 





JOINTS:


Degenerative sclerosis of the tibiotalar joint articular cortices is again 

appreciated mildly. Ankle mortise maintained. Talar dome intact





SOFT TISSUES:


Normal. 





OTHER FINDINGS:


None.





IMPRESSION:


Distal tibial and fibular fractures are again seen abdomen intermediate stage 

of healing with no interval fracture appreciated.  No dislocation. Diffuse 

osteopenia suggests osteoporosis with overlying cast obscuring fine bony and 

soft-tissue detail.





Patient Name / ID : YAYA CLIFTON  / [id number]


Exam Date : 11/24/2017 12:57:09 ( Approved )


Study Comment : 


Sex / Age : F  / 67Y





Creator : Edvin Hatfield MD


Dictator : Edvin Hatifeld MD


 : 


Approver : Edvin Hatfield MD


Approver2 : 





Report Date : 11/24/2017 13:46:23


My Comment : 


********************************************************************************

***





PROCEDURE:  Radiographs of the Right Forearm 





HISTORY:


repeat, radius non union











COMPARISON:


Right forearm radiographs 11/22/2017.





TECHNIQUE:


Frontal and lateral views obtained. 





FINDINGS:





BONES:


Mid to distal diaphyseal fractures of the right radius and ulna are again 

identified with callus formation again seen at the ulnar fracture site but not 

identified at the radial fracture site. Mild impaction is again seen the radial 

fracture site. No definitive dislocation at the wrist or elbow. Diffuse 

osteopenia suggests osteoporosis with gas obscuring fine bony and soft-tissue 

detail.





JOINT SPACES:


Degenerative changes seen at the right wrist and elbow joint moderately.





OTHER FINDINGS:


None.





IMPRESSION:


Fractures are identified intermediate stage of healing both the diaphyses of 

the radius and ulna although healing is more advanced at the ulna than at the 

radius fracture site. Please see discussion above.

## 2017-11-27 NOTE — CARD
--------------- APPROVED REPORT --------------





EXAM: Two-dimensional and M-mode echocardiogram with Doppler and 

color Doppler.



Other Information 

Quality : GoodRhythm : 



INDICATION

Dizziness and Vertigo Congestive Heart Failure FEVER,DIALYSIS



RISK FACTORS

Hypertension 



2D DIMENSIONS 

LVOT Diameter1.7   (1.8-2.4cm)



M-Mode DIMENSIONS 

RVDd3.46   (2.1-3.2cm)Left Atrium (MM)4.70   (2.5-4.0cm)

IVSd1.15   (0.7-1.1cm)Aortic Root2.64   (2.2-3.7cm)

LVDd5.36   (4.0-5.6cm)Aortic Cusp Exc.1.40   (1.5-2.0cm)

PWd1.15   (0.7-1.1cm)FS (%) 29   %

LVDs3.79   (2.0-3.8cm)LVEF (%)56   (>50%)



Aortic Valve

AoV Peak Hcjgpebk256.6cm/sAoV VTI51.6cmAO Peak GR.22mmHg

LVOT Peak Hgoyrpyq791.3cm/sLVOT VTI37.62cmAO Mean GR.9mmHg

NIGHAT (VMAX)1.74mw8ZAD (VTI)1.70cm2



Mitral Valve

MV E Tpozcuxx386.7cm/sMV A Klchsuhq43.9cm/sE/A ratio1.3



TDI

E/Lateral E'0.0E/Medial E'0.0



Tricuspid Valve

TR Peak Ojayqewx667gj/sTR Peak Gr.20ujPtHUZO66trXv



 LEFT VENTRICLE 

The left ventricle is normal size.

There is mild concentric left ventricular hypertrophy.

The Ejection Fraction is 55-60%.

There is normal LV segmental wall motion.

The left ventricular diastolic function is normal.



 RIGHT VENTRICLE 

The right ventricle is normal size.

The right ventricular systolic function is normal.



 ATRIA 

The left atrium is moderately dilated.

The right atrium size is normal.

The interatrial septum is intact with no evidence for an atrial 

septal defect.



 AORTIC VALVE 

The aortic valve is mildly sclerotic.

No aortic regurgitation is present.

peak/mean gradinet of 22/9 mm of hg.

mild as.



 MITRAL VALVE 

The mitral valve is mildly thickened.

Mitral regurgitation is trace to mild.



 TRICUSPID VALVE 

The tricuspid valve is normal in structure.

There is mild tricuspid regurgitation.

Right ventricular systolic pressure is estimated at 48 mmHg. 

There is moderate pulmonary hypertension.



 PULMONIC VALVE 

The pulmonary valve is normal in structure.



 GREAT VESSELS 

The aortic root is normal size.

The aortic root displays mild sclerocalcific changes of the aortic 

root.

The IVC is normal in size and collapses >50% with inspiration.



 PERICARDIAL EFFUSION 

There is no pericardial effusion.



<Conclusion>

The left ventricle is normal size.

There is mild concentric left ventricular hypertrophy.

The Ejection Fraction is 55-60%.

The left ventricular diastolic function is normal.

The left atrium is moderately dilated.

peak/mean gradinet of 22/9 mm of hg.

mild as.

Mitral regurgitation is trace to mild.

There is mild tricuspid regurgitation.

Right ventricular systolic pressure is estimated at 48 mmHg. 

There is moderate pulmonary hypertension.

The aortic root is normal size.

The aortic root displays mild sclerocalcific changes of the aortic 

root.

## 2017-11-27 NOTE — CP.PCM.PN
Subjective





- Date & Time of Evaluation


Date of Evaluation: 11/27/17


Time of Evaluation: 11:29





- Subjective


Subjective: 





No new complaints


Casts in place- pain better tolerated


stable dialysis 11/25


on IV ABs for bacteremia








Objective





- Vital Signs/Intake and Output


Vital Signs (last 24 hours): 


 











Temp Pulse Resp BP Pulse Ox


 


 97.5 F L  63   20   144/61   97 


 


 11/27/17 09:27  11/27/17 09:27  11/27/17 09:27  11/27/17 09:27  11/27/17 09:27








Intake and Output: 


 











 11/27/17 11/27/17





 06:59 18:59


 


Intake Total 290 


 


Balance 290 














- Medications


Medications: 


 Current Medications





Acetaminophen (Tylenol 325mg Tab)  650 mg PO Q6 PRN


   PRN Reason: Fever >100.4 F


Calcium/Vitamin D (Oyster Shell Calcium/Vitamin D 500 Mg-200 Iu)  1 tab PO 

DAILY [facility]


   Last Admin: 11/27/17 10:21 Dose:  1 tab


Clonidine HCl (Catapres)  0.1 mg PO HS [facility]


   Last Admin: 11/26/17 21:09 Dose:  0.1 mg


Docusate Sodium (Colace)  100 mg PO BID PRN


   PRN Reason: Constipation


   Last Admin: 11/24/17 09:16 Dose:  100 mg


Epoetin Jeff (Procrit)  10,000 unit IV TTS [facility]


   Last Admin: 11/25/17 09:59 Dose:  10,000 unit


Heparin Sodium (Porcine) (Heparin)  5,000 units SC Q8 [facility]


   Last Admin: 11/27/17 05:05 Dose:  5,000 units


Piperacillin Sod/Tazobactam Sod (Zosyn 2.25 Gm Iv Premix)  2.25 gm in 50 mls @ 

100 mls/hr IVPB Q8 [facility]


   Last Admin: 11/27/17 05:06 Dose:  100 mls/hr


Azithromycin 500 mg/ Sodium (Chloride)  250 mls @ 250 mls/hr IVPB Q24H [facility]


   Last Admin: 11/26/17 16:15 Dose:  250 mls/hr


Metoprolol Succinate (Toprol Xl)  100 mg PO DAILY [facility]


   Last Admin: 11/27/17 10:21 Dose:  100 mg


Oxycodone/Acetaminophen (Percocet 5/325 Mg Tab)  1 tab PO Q4H PRN


   PRN Reason: Pain, moderate (4-7)


   Stop: 11/29/17 21:44


   Last Admin: 11/26/17 21:51 Dose:  1 tab


Sertraline HCl (Zoloft)  100 mg PO DAILY [facility]


   Last Admin: 11/27/17 10:24 Dose:  100 mg


Sodium Polystyrene Sulfonate (Kayexalate Susp)  15 gm PO ONCE PRN


   PRN Reason: K LEVEL ABOVE 5.5











- Labs


Labs: 


 





 11/23/17 10:37 





 11/23/17 10:37 











- Constitutional


Appears: No Acute Distress, Chronically Ill





- Head Exam


Head Exam: ATRAUMATIC, NORMAL INSPECTION





- Eye Exam


Eye Exam: EOMI, Normal appearance





- Neck Exam


Neck Exam: Normal Inspection.  absent: Tenderness





- Respiratory Exam


Respiratory Exam: Clear to Ausculation Bilateral, NORMAL BREATHING PATTERN





- Cardiovascular Exam


Cardiovascular Exam: REGULAR RHYTHM, +S1





- GI/Abdominal Exam


GI & Abdominal Exam: Soft.  absent: Tenderness





- Extremities Exam


Extremities Exam: Calf Tenderness, Tenderness





- Neurological Exam


Neurological Exam: Alert, CN II-XII Intact





- Skin


Skin Exam: Dry, Warm





Assessment and Plan


(1) Failed kidney transplant


Status: Acute   





(2) CHF (congestive heart failure)


Status: Acute   





(3) End stage renal disease


Status: Acute   





(4) HTN (hypertension)


Status: Acute   





(5) Closed fracture of distal end of left fibula and tibia


Status: Chronic   





(6) Bacteremia due to Gram-positive bacteria


Status: Acute   





- Assessment and Plan (Free Text)


Plan: 





continue dialysis TTS


IV ABs

## 2017-11-28 VITALS — RESPIRATION RATE: 18 BRPM

## 2017-11-28 VITALS — SYSTOLIC BLOOD PRESSURE: 157 MMHG | DIASTOLIC BLOOD PRESSURE: 58 MMHG

## 2017-11-28 VITALS — TEMPERATURE: 97.1 F | HEART RATE: 68 BPM

## 2017-11-28 VITALS — OXYGEN SATURATION: 100 %

## 2017-11-28 LAB
BUN SERPL-MCNC: 45 MG/DL (ref 7–17)
CALCIUM SERPL-MCNC: 8.7 MG/DL (ref 8.6–10.4)
CHLORIDE SERPL-SCNC: 91 MMOL/L (ref 98–107)
CO2 SERPL-SCNC: 23 MMOL/L (ref 22–30)
ERYTHROCYTE [DISTWIDTH] IN BLOOD BY AUTOMATED COUNT: 17.6 % (ref 11.5–14.5)
GLUCOSE SERPL-MCNC: 102 MG/DL (ref 65–105)
HCT VFR BLD CALC: 26.2 % (ref 34–47)
MCH RBC QN AUTO: 33 PG (ref 27–31)
MCHC RBC AUTO-ENTMCNC: 33.6 G/DL (ref 33–37)
MCV RBC AUTO: 98.2 FL (ref 81–99)
PLATELET # BLD: 387 K/UL (ref 130–400)
PMV BLD AUTO: 8.3 FL (ref 7.2–11.7)
POTASSIUM SERPL-SCNC: 4.3 MMOL/L (ref 3.6–5.2)
SODIUM SERPL-SCNC: 130 MMOL/L (ref 132–148)
WBC # BLD AUTO: 18 K/UL (ref 4.8–10.8)

## 2017-11-28 RX ADMIN — CALCIUM CARBONATE-VITAMIN D TAB 500 MG-200 UNIT SCH TAB: 500-200 TAB at 10:19

## 2017-11-28 RX ADMIN — METOPROLOL SUCCINATE SCH: 100 TABLET, EXTENDED RELEASE ORAL at 10:41

## 2017-11-28 RX ADMIN — TAZOBACTAM SODIUM AND PIPERACILLIN SODIUM SCH MLS/HR: 250; 2 INJECTION, SOLUTION INTRAVENOUS at 05:34

## 2017-11-28 RX ADMIN — OXYCODONE HYDROCHLORIDE AND ACETAMINOPHEN PRN TAB: 5; 325 TABLET ORAL at 04:26

## 2017-11-28 RX ADMIN — ERYTHROPOIETIN SCH UNIT: 10000 INJECTION, SOLUTION INTRAVENOUS; SUBCUTANEOUS at 16:48

## 2017-11-28 RX ADMIN — OXYCODONE HYDROCHLORIDE AND ACETAMINOPHEN PRN TAB: 5; 325 TABLET ORAL at 10:17

## 2017-11-28 RX ADMIN — OXYCODONE HYDROCHLORIDE AND ACETAMINOPHEN PRN TAB: 5; 325 TABLET ORAL at 18:59

## 2017-11-28 RX ADMIN — TAZOBACTAM SODIUM AND PIPERACILLIN SODIUM SCH: 250; 2 INJECTION, SOLUTION INTRAVENOUS at 13:46

## 2017-11-28 NOTE — CP.PCM.PN
Subjective





- Date & Time of Evaluation


Date of Evaluation: 11/28/17


Time of Evaluation: 10:49





- Subjective


Subjective: 





seen and examined


pain improved. possible dc to rehab today


no fever chills sob chest pain nausea vomiting





Objective





- Vital Signs/Intake and Output


Vital Signs (last 24 hours): 


 











Temp Pulse Resp BP Pulse Ox


 


 98.4 F   70   20   144/66   100 


 


 11/28/17 08:33  11/28/17 08:33  11/28/17 08:33  11/28/17 08:33  11/28/17 08:33











- Medications


Medications: 


 Current Medications





Acetaminophen (Tylenol 325mg Tab)  650 mg PO Q6 PRN


   PRN Reason: Fever >100.4 F


Calcium/Vitamin D (Oyster Shell Calcium/Vitamin D 500 Mg-200 Iu)  1 tab PO 

DAILY [facility]


   Last Admin: 11/28/17 10:19 Dose:  1 tab


Clonidine HCl (Catapres)  0.1 mg PO HS [facility]


   Last Admin: 11/27/17 21:52 Dose:  0.1 mg


Docusate Sodium (Colace)  100 mg PO BID PRN


   PRN Reason: Constipation


   Last Admin: 11/24/17 09:16 Dose:  100 mg


Epoetin Jeff (Procrit)  10,000 unit IV TTS [facility]


   Last Admin: 11/25/17 09:59 Dose:  10,000 unit


Heparin Sodium (Porcine) (Heparin)  5,000 units SC Q8 [facility]


   Last Admin: 11/28/17 05:34 Dose:  5,000 units


Piperacillin Sod/Tazobactam Sod (Zosyn 2.25 Gm Iv Premix)  2.25 gm in 50 mls @ 

100 mls/hr IVPB Q8 [facility]


   Last Admin: 11/28/17 05:34 Dose:  100 mls/hr


Azithromycin 500 mg/ Sodium (Chloride)  250 mls @ 250 mls/hr IVPB Q24H [facility]


   Last Admin: 11/27/17 16:00 Dose:  250 mls/hr


Metoprolol Succinate (Toprol Xl)  100 mg PO DAILY [facility]


   Last Admin: 11/28/17 10:41 Dose:  Not Given


Oxycodone/Acetaminophen (Percocet 5/325 Mg Tab)  1 tab PO Q4H PRN


   PRN Reason: Pain, moderate (4-7)


   Stop: 11/29/17 21:44


   Last Admin: 11/28/17 10:17 Dose:  1 tab


Sertraline HCl (Zoloft)  100 mg PO DAILY [facility]


   Last Admin: 11/28/17 10:19 Dose:  100 mg


Sodium Polystyrene Sulfonate (Kayexalate Susp)  15 gm PO ONCE PRN


   PRN Reason: K LEVEL ABOVE 5.5











- Labs


Labs: 


 





 11/23/17 10:37 





 11/23/17 10:37 











- Constitutional


Appears: No Acute Distress, Chronically Ill





- Head Exam


Head Exam: NORMAL INSPECTION





- Eye Exam


Eye Exam: Normal appearance





- ENT Exam


ENT Exam: Mucous Membranes Moist, Normal Exam





- Respiratory Exam


Respiratory Exam: Clear to Ausculation Bilateral, NORMAL BREATHING PATTERN





- Cardiovascular Exam


Cardiovascular Exam: REGULAR RHYTHM, RRR





- GI/Abdominal Exam


GI & Abdominal Exam: Distended, Soft, Normal Bowel Sounds





- Extremities Exam


Extremities Exam: Normal Inspection (rt leg in cast)





- Skin


Skin Exam: Intact, Normal Color





Assessment and Plan


(1) Bacteremia due to Gram-positive bacteria


Status: Acute   





(2) Fever


Status: Acute   





(3) Pneumonia


Status: Acute   





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


Status: Chronic   





(5) ESRD (end stage renal disease) on dialysis


Status: Acute   





(6) HTN (hypertension)


Status: Acute   





- Assessment and Plan (Free Text)


Assessment: 





maintain hd tts. 3K bath


antibiotics per ID

## 2017-11-28 NOTE — CP.PCM.PN
Subjective





- Date & Time of Evaluation


Date of Evaluation: 11/28/17


Time of Evaluation: 09:23





- Subjective


Subjective: 





feels beter no fever pain controled with med 





Objective





- Vital Signs/Intake and Output


Vital Signs (last 24 hours): 


 











Temp Pulse Resp BP Pulse Ox


 


 98.4 F   70   20   144/66   100 


 


 11/28/17 08:33  11/28/17 08:33  11/28/17 08:33  11/28/17 08:33  11/28/17 08:33











- Medications


Medications: 


 Current Medications





Acetaminophen (Tylenol 325mg Tab)  650 mg PO Q6 PRN


   PRN Reason: Fever >100.4 F


Calcium/Vitamin D (Oyster Shell Calcium/Vitamin D 500 Mg-200 Iu)  1 tab PO 

DAILY [facility]


   Last Admin: 11/27/17 10:21 Dose:  1 tab


Clonidine HCl (Catapres)  0.1 mg PO HS [facility]


   Last Admin: 11/27/17 21:52 Dose:  0.1 mg


Docusate Sodium (Colace)  100 mg PO BID PRN


   PRN Reason: Constipation


   Last Admin: 11/24/17 09:16 Dose:  100 mg


Epoetin Jeff (Procrit)  10,000 unit IV TTS [facility]


   Last Admin: 11/25/17 09:59 Dose:  10,000 unit


Heparin Sodium (Porcine) (Heparin)  5,000 units SC Q8 [facility]


   Last Admin: 11/28/17 05:34 Dose:  5,000 units


Piperacillin Sod/Tazobactam Sod (Zosyn 2.25 Gm Iv Premix)  2.25 gm in 50 mls @ 

100 mls/hr IVPB Q8 [facility]


   Last Admin: 11/28/17 05:34 Dose:  100 mls/hr


Azithromycin 500 mg/ Sodium (Chloride)  250 mls @ 250 mls/hr IVPB Q24H [facility]


   Last Admin: 11/27/17 16:00 Dose:  250 mls/hr


Metoprolol Succinate (Toprol Xl)  100 mg PO DAILY [facility]


   Last Admin: 11/27/17 10:21 Dose:  100 mg


Oxycodone/Acetaminophen (Percocet 5/325 Mg Tab)  1 tab PO Q4H PRN


   PRN Reason: Pain, moderate (4-7)


   Stop: 11/29/17 21:44


   Last Admin: 11/28/17 04:26 Dose:  1 tab


Sertraline HCl (Zoloft)  100 mg PO DAILY [facility]


   Last Admin: 11/27/17 10:24 Dose:  100 mg


Sodium Polystyrene Sulfonate (Kayexalate Susp)  15 gm PO ONCE PRN


   PRN Reason: K LEVEL ABOVE 5.5











- Labs


Labs: 


 





 11/23/17 10:37 





 11/23/17 10:37 











- Constitutional


Appears: Non-toxic





- Head Exam


Head Exam: NORMAL INSPECTION





- Eye Exam


Eye Exam: Normal appearance


Pupil Exam: NORMAL ACCOMODATION





- ENT Exam


ENT Exam: Mucous Membranes Moist





- Neck Exam


Neck Exam: Full ROM





- Respiratory Exam


Respiratory Exam: Clear to Ausculation Bilateral





- GI/Abdominal Exam


GI & Abdominal Exam: Normal Bowel Sounds





- Rectal Exam


Rectal Exam: NORMAL INSPECTION





- Extremities Exam


Additional comments: 





casts uper and lower ext 





- Back Exam


Back Exam: NORMAL INSPECTION





- Neurological Exam


Neurological Exam: Normal Gait





- Psychiatric Exam


Psychiatric exam: Normal Affect





- Skin


Skin Exam: Normal Color





Assessment and Plan





- Assessment and Plan (Free Text)


Assessment: 





ac pnumonia improved ac bacreamia cont iv antibiotics possily in [facility] 

fx 


Plan: 





alexandria

## 2017-11-28 NOTE — CP.PCM.PN
Subjective





- Date & Time of Evaluation


Date of Evaluation: 11/28/17


Time of Evaluation: 11:25





- Subjective


Subjective: 





Awake, alert, no sob or chest pains, NAD.





Objective





- Vital Signs/Intake and Output


Vital Signs (last 24 hours): 


 











Temp Pulse Resp BP Pulse Ox


 


 98.4 F   70   20   144/66   100 


 


 11/28/17 08:33  11/28/17 08:33  11/28/17 08:33  11/28/17 08:33  11/28/17 08:33











- Medications


Medications: 


 Current Medications





Acetaminophen (Tylenol 325mg Tab)  650 mg PO Q6 PRN


   PRN Reason: Fever >100.4 F


Calcium/Vitamin D (Oyster Shell Calcium/Vitamin D 500 Mg-200 Iu)  1 tab PO 

DAILY [facility]


   Last Admin: 11/28/17 10:19 Dose:  1 tab


Clonidine HCl (Catapres)  0.1 mg PO HS [facility]


   Last Admin: 11/27/17 21:52 Dose:  0.1 mg


Docusate Sodium (Colace)  100 mg PO BID PRN


   PRN Reason: Constipation


   Last Admin: 11/24/17 09:16 Dose:  100 mg


Epoetin Jeff (Procrit)  10,000 unit IV TTS [facility]


   Last Admin: 11/25/17 09:59 Dose:  10,000 unit


Heparin Sodium (Porcine) (Heparin)  5,000 units SC Q8 [facility]


   Last Admin: 11/28/17 05:34 Dose:  5,000 units


Piperacillin Sod/Tazobactam Sod (Zosyn 2.25 Gm Iv Premix)  2.25 gm in 50 mls @ 

100 mls/hr IVPB Q8 [facility]


   Last Admin: 11/28/17 05:34 Dose:  100 mls/hr


Azithromycin 500 mg/ Sodium (Chloride)  250 mls @ 250 mls/hr IVPB Q24H [facility]


   Last Admin: 11/27/17 16:00 Dose:  250 mls/hr


Metoprolol Succinate (Toprol Xl)  100 mg PO DAILY [facility]


   Last Admin: 11/28/17 10:41 Dose:  Not Given


Oxycodone/Acetaminophen (Percocet 5/325 Mg Tab)  1 tab PO Q4H PRN


   PRN Reason: Pain, moderate (4-7)


   Stop: 11/29/17 21:44


   Last Admin: 11/28/17 10:17 Dose:  1 tab


Sertraline HCl (Zoloft)  100 mg PO DAILY [facility]


   Last Admin: 11/28/17 10:19 Dose:  100 mg


Sodium Polystyrene Sulfonate (Kayexalate Susp)  15 gm PO ONCE PRN


   PRN Reason: K LEVEL ABOVE 5.5











- Labs


Labs: 


 





 11/23/17 10:37 





 11/23/17 10:37 











Assessment and Plan





- Assessment and Plan (Free Text)


Assessment: 





Patient is seen and examined. Alert and oriented, complaints of pain on the 

legs and arm. On percocet prn for pain with good effect. Discussed with DR Allison, plan to discharge to [facility] today after the HD. To be seen by 

ortho within 1 week after discharge. To cotinue with ancef 2 gm after each 

dialysis for 6 weeks for bacteremia as per DR Arroyo.

## 2017-11-28 NOTE — CP.PCM.PN
Subjective





- Date & Time of Evaluation


Date of Evaluation: 11/28/17


Time of Evaluation: 10:00





- Subjective


Subjective: 





discussed on rounds


iv rx in progress


for d/c on IV ancef 2 g post hd x 6 weeks 





Objective





- Vital Signs/Intake and Output


Vital Signs (last 24 hours): 


 











Temp Pulse Resp BP Pulse Ox


 


 98.4 F   70   20   144/66   100 


 


 11/28/17 08:33  11/28/17 08:33  11/28/17 08:33  11/28/17 08:33  11/28/17 08:33











- Medications


Medications: 


 Current Medications





Acetaminophen (Tylenol 325mg Tab)  650 mg PO Q6 PRN


   PRN Reason: Fever >100.4 F


Calcium/Vitamin D (Oyster Shell Calcium/Vitamin D 500 Mg-200 Iu)  1 tab PO 

DAILY [facility]


   Last Admin: 11/28/17 10:19 Dose:  1 tab


Clonidine HCl (Catapres)  0.1 mg PO HS [facility]


   Last Admin: 11/27/17 21:52 Dose:  0.1 mg


Docusate Sodium (Colace)  100 mg PO BID PRN


   PRN Reason: Constipation


   Last Admin: 11/24/17 09:16 Dose:  100 mg


Epoetin Jeff (Procrit)  10,000 unit IV TTS [facility]


   Last Admin: 11/25/17 09:59 Dose:  10,000 unit


Heparin Sodium (Porcine) (Heparin)  5,000 units SC Q8 [facility]


   Last Admin: 11/28/17 05:34 Dose:  5,000 units


Piperacillin Sod/Tazobactam Sod (Zosyn 2.25 Gm Iv Premix)  2.25 gm in 50 mls @ 

100 mls/hr IVPB Q8 [facility]


   Last Admin: 11/28/17 05:34 Dose:  100 mls/hr


Azithromycin 500 mg/ Sodium (Chloride)  250 mls @ 250 mls/hr IVPB Q24H [facility]


   Last Admin: 11/27/17 16:00 Dose:  250 mls/hr


Metoprolol Succinate (Toprol Xl)  100 mg PO DAILY [facility]


   Last Admin: 11/28/17 10:41 Dose:  Not Given


Oxycodone/Acetaminophen (Percocet 5/325 Mg Tab)  1 tab PO Q4H PRN


   PRN Reason: Pain, moderate (4-7)


   Stop: 11/29/17 21:44


   Last Admin: 11/28/17 10:17 Dose:  1 tab


Sertraline HCl (Zoloft)  100 mg PO DAILY [facility]


   Last Admin: 11/28/17 10:19 Dose:  100 mg


Sodium Polystyrene Sulfonate (Kayexalate Susp)  15 gm PO ONCE PRN


   PRN Reason: K LEVEL ABOVE 5.5











- Labs


Labs: 


 





 11/23/17 10:37 





 11/23/17 10:37 











- Constitutional


Appears: Non-toxic, Chronically Ill





- Head Exam


Head Exam: NORMOCEPHALIC





- Eye Exam


Eye Exam: PERRL





- ENT Exam


ENT Exam: Mucous Membranes Dry





- Neck Exam


Neck Exam: absent: Lymphadenopathy





- Respiratory Exam


Respiratory Exam: Decreased Breath Sounds





- Cardiovascular Exam


Cardiovascular Exam: REGULAR RHYTHM





- GI/Abdominal Exam


GI & Abdominal Exam: Distended, Soft





- Rectal Exam


Rectal Exam: Deferred





Assessment and Plan


(1) Bacteremia due to Gram-positive bacteria


Status: Acute   





(2) Failed kidney transplant


Status: Acute   





(3) Fever


Status: Acute   





(4) Pneumonia


Status: Acute   





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


Status: Chronic

## 2018-02-10 ENCOUNTER — HOSPITAL ENCOUNTER (INPATIENT)
Dept: HOSPITAL 31 - C.ER | Age: 68
LOS: 3 days | Discharge: ANOTHER HOSPITAL | DRG: 252 | End: 2018-02-13
Attending: INTERNAL MEDICINE | Admitting: INTERNAL MEDICINE
Payer: MEDICARE

## 2018-02-10 VITALS — BODY MASS INDEX: 23.8 KG/M2

## 2018-02-10 DIAGNOSIS — M79.7: ICD-10-CM

## 2018-02-10 DIAGNOSIS — I50.9: ICD-10-CM

## 2018-02-10 DIAGNOSIS — M81.0: ICD-10-CM

## 2018-02-10 DIAGNOSIS — T82.838A: Primary | ICD-10-CM

## 2018-02-10 DIAGNOSIS — I13.2: ICD-10-CM

## 2018-02-10 DIAGNOSIS — Z99.2: ICD-10-CM

## 2018-02-10 DIAGNOSIS — N18.6: ICD-10-CM

## 2018-02-10 DIAGNOSIS — F41.9: ICD-10-CM

## 2018-02-10 DIAGNOSIS — D64.9: ICD-10-CM

## 2018-02-10 DIAGNOSIS — L98.499: ICD-10-CM

## 2018-02-10 DIAGNOSIS — R09.02: ICD-10-CM

## 2018-02-10 DIAGNOSIS — Z86.73: ICD-10-CM

## 2018-02-10 DIAGNOSIS — Z90.49: ICD-10-CM

## 2018-02-10 DIAGNOSIS — Z87.891: ICD-10-CM

## 2018-02-10 LAB
ALBUMIN SERPL-MCNC: 3.3 G/DL (ref 3.5–5)
ALBUMIN/GLOB SERPL: 0.8 {RATIO} (ref 1–2.1)
ALT SERPL-CCNC: 15 U/L (ref 9–52)
AST SERPL-CCNC: 57 U/L (ref 14–36)
BASOPHILS # BLD AUTO: 0.1 K/UL (ref 0–0.2)
BASOPHILS NFR BLD: 0.8 % (ref 0–2)
BUN SERPL-MCNC: 42 MG/DL (ref 7–17)
CALCIUM SERPL-MCNC: 8.5 MG/DL (ref 8.6–10.4)
EOSINOPHIL # BLD AUTO: 0.2 K/UL (ref 0–0.7)
EOSINOPHIL NFR BLD: 3.3 % (ref 0–4)
ERYTHROCYTE [DISTWIDTH] IN BLOOD BY AUTOMATED COUNT: 16.9 % (ref 11.5–14.5)
GFR NON-AFRICAN AMERICAN: 11
HGB BLD-MCNC: 11 G/DL (ref 11–16)
LYMPHOCYTES # BLD AUTO: 1.1 K/UL (ref 1–4.3)
LYMPHOCYTES NFR BLD AUTO: 16 % (ref 20–40)
MCH RBC QN AUTO: 33.9 PG (ref 27–31)
MCHC RBC AUTO-ENTMCNC: 33.4 G/DL (ref 33–37)
MCV RBC AUTO: 101.4 FL (ref 81–99)
MONOCYTES # BLD: 1.3 K/UL (ref 0–0.8)
MONOCYTES NFR BLD: 18.4 % (ref 0–10)
NEUTROPHILS # BLD: 4.4 K/UL (ref 1.8–7)
NEUTROPHILS NFR BLD AUTO: 61.5 % (ref 50–75)
NRBC BLD AUTO-RTO: 0 % (ref 0–2)
PLATELET # BLD: 246 K/UL (ref 130–400)
PMV BLD AUTO: 7.6 FL (ref 7.2–11.7)
RBC # BLD AUTO: 3.24 MIL/UL (ref 3.8–5.2)
WBC # BLD AUTO: 7.1 K/UL (ref 4.8–10.8)

## 2018-02-10 NOTE — CP.PCM.HP
History of Present Illness





- History of Present Illness


History of Present Illness: 





[place] admited for bleedinf [place] her dialysis graft in 

her arm 





Present on Admission





- Present on Admission


Any Indicators Present on Admission: Yes





Review of Systems





- Review of Systems


Systems not reviewed;Unavailable: Acuity of Condition





- Constitutional


Constitutional: Weight Gain





- EENT


Eyes: As Per HPI


Nose/Mouth/Throat: As Per HPI





- Breasts


Breasts: Swelling





- Cardiovascular


Cardiovascular: Dyspnea on Exertion





- Respiratory


Respiratory: Dyspnea on Exertion





- Gastrointestinal


Gastrointestinal: As Per HPI





- Genitourinary


Genitourinary: As Per HPI





- Reproductive: Female


Reproductive:Female: As Per HPI





- Menstruation


Menstruation: As Per HPI





- Musculoskeletal


Musculoskeletal: Numbness


Additional comments: 





hx of mutiple fx uper and lower ext





- Integumentary


Integumentary: Bleeding Lesions





- Neurological


Neurological: Frequent Falls





- Psychiatric


Psychiatric: As Per HPI





- Endocrine


Endocrine: As Per HPI





- Hematologic/Lymphatic


Hematologic: Easy Bleeding





Past Patient History





- Infectious Disease


Hx of Infectious Diseases: None





- Past Medical History & Family History


Past Medical History?: Yes





- Past Social History


Smoking Status: Never Smoked





- CARDIAC


Hx Congestive Heart Failure: Yes


Hx Hypertension: Yes





- PULMONARY


Hx Respiratory Disorders: No





- NEUROLOGICAL


Hx Transient Ischemic Attacks (TIA): Yes





- HEENT


Hx HEENT Problems: No





- RENAL


Hx Chronic Kidney Disease: Yes





- ENDOCRINE/METABOLIC


Hx Endocrine Disorders: No





- HEMATOLOGICAL/ONCOLOGICAL


Hx Blood Disorders: Yes


Hx Blood Transfusions: Yes


Hx Blood Transfusion Reaction: Yes


Hx Shingles: Yes (right arm)





- INTEGUMENTARY


Hx Dermatological Problems: No





- MUSCULOSKELETAL/RHEUMATOLOGICAL


Hx Arthritis: Yes (OA)


Hx Fractures: Yes (Current right leg fx, RUE, LLE)


Hx Osteoporosis: Yes





- GASTROINTESTINAL


Hx Diverticulitis: Yes


Hx Pancreatitis: Yes





- GENITOURINARY/GYNECOLOGICAL


Hx Genitourinary Disorders: No


Other/Comment: DIALYSIS PATIENT





- PSYCHIATRIC


Hx Anxiety: Yes


Hx Substance Use: No





- SURGICAL HISTORY


Hx Cholecystectomy: Yes





- ANESTHESIA


Hx Anesthesia: Yes


Hx Anesthesia Reactions: No


Hx Malignant Hyperthermia: No





Meds


Allergies/Adverse Reactions: 


 Allergies











Allergy/AdvReac Type Severity Reaction Status Date / Time


 


aspirin Allergy  NAUSEA Verified 11/21/17 17:14


 


lactose Allergy  NAUSEA Verified 11/21/17 17:14














Physical Exam





- Constitutional


Appears: Non-toxic





- Head Exam


Head Exam: ATRAUMATIC





- Eye Exam


Eye Exam: Normal appearance


Pupil Exam: PERRL





- ENT Exam


ENT Exam: Mucous Membranes Dry





- Neck Exam


Neck exam: Positive for: Full Rom





- Respiratory Exam


Respiratory Exam: NORMAL BREATHING PATTERN





- Cardiovascular Exam


Cardiovascular Exam: REGULAR RHYTHM





- GI/Abdominal Exam


GI & Abdominal Exam: Normal Bowel Sounds





- Rectal Exam


Rectal Exam: NORMAL INSPECTION





- Extremities Exam


Additional comments: 





l arm dialysis shunt ulcer bleeding





- Back Exam


Back exam: NORMAL INSPECTION





- Neurological Exam


Neurological exam: Alert, Oriented x3





- Psychiatric Exam


Psychiatric exam: Normal Affect





- Skin


Skin Exam: Pallor





Results





- Vital Signs


Recent Vital Signs: 





 Last Vital Signs











Temp  97.8 F   02/10/18 13:40


 


Pulse  84   02/10/18 13:40


 


Resp  20   02/10/18 13:40


 


BP  127/62   02/10/18 13:40


 


Pulse Ox  94 L  02/10/18 14:38














Assessment & Plan





- Assessment and Plan (Free Text)


Assessment: 





ac bleeding ulcer dialysis shunt l arm  esrf sp fx 


Plan: 





admit dr talbert will see pt





- Date & Time


Date: 02/10/18


Time: 15:09

## 2018-02-10 NOTE — C.PDOC
History Of Present Illness


67-year-old female, PMHx includes ESRD on HD, presents to the emergency 

department with complaints of bleeding from fistula site. Patient states her 

left arm was sore, and eroding, due to it being used too much. Patient applied 

an ointment and band-aid to the area. When the band-aid was removed this morning

, the area began bleeding, which took twenty-minutes to control with pressure 

dressing. She denies any pain at this time.


Time Seen by Provider: 02/10/18 13:45


Chief Complaint (Nursing): Medical Clearance


History Per: Patient


History/Exam Limitations: no limitations


Current Symptoms Are (Timing): Better





Past Medical History


Reviewed: Historical Data, Nursing Documentation, Vital Signs


Vital Signs: 


 Last Vital Signs











Temp  97.8 F   02/10/18 13:40


 


Pulse  84   02/10/18 13:40


 


Resp  20   02/10/18 13:40


 


BP  127/62   02/10/18 13:40


 


Pulse Ox  94 L  02/10/18 14:38














- Medical History


PMH: Anxiety, Arthritis (OA), CHF, Diverticulitis, Fibromyalgia, Fractures (

Current right leg fx, RUE, LLE), HTN, Osteoporosis, Pancreatitis, End Stage 

Renal Disease, Chronic Kidney Disease, TIA


Surgical History: Cholecystectomy





- [facility] Procedures








 (11/06/17)


BUNIONECT/SFT/OSTEOTOMY (12/13/13)


IMMOBILIZATION OF LEFT LOWER LEG USING SPLINT (11/06/17)


IMMOBILIZATION OF RIGHT UPPER EXTREMITY USING SPLINT (11/06/17)


OPEN REDUCT-INT FIX TOE (12/13/13)








Family History: States: No Known Family Hx





- Social History


Hx Alcohol Use: No


Hx Substance Use: No





- Immunization History


Hx Tetanus Toxoid Vaccination: No


Hx Influenza Vaccination: No


Hx Pneumococcal Vaccination: No





Review Of Systems


Except As Marked, All Systems Reviewed And Found Negative.


Constitutional: Negative for: Fever


Cardiovascular: Negative for: Chest Pain, Palpitations


Respiratory: Negative for: Shortness of Breath


Gastrointestinal: Negative for: Nausea, Vomiting


Neurological: Negative for: Weakness, Numbness





Physical Exam





- Physical Exam


Appears: Non-toxic, No Acute Distress


Skin: Warm, Dry, No Rash


Neck: Normal ROM


Chest: Symmetrical


Cardiovascular: Rhythm Regular, No Murmur


Respiratory: Normal Breath Sounds, No Accessory Muscle Use


Gastrointestinal/Abdominal: Soft, No Tenderness


Extremity: No Deformity, No Swelling, Other (Left upper arm: AV fistula with 

palpable thrill. 1cm ulceration on the fistula, fresh but no active bleeding at 

this time.)


Neurological/Psych: Oriented x3





ED Course And Treatment


O2 Sat by Pulse Oximetry: 94 (RA)


Pulse Ox Interpretation: Normal





- Physician Consult Information


Outcome Of Conversation: Case discussed iwth Dr Mock and Dr Allison. She 

will remain in the hospital for fistula repair in the morning.





Medical Decision Making


Medical Decision Making: 


Plan:


* Type and Screen


* CMP


* CBC


* Reassess and Disposition





Patients wound was cleaned and dressed in sterile fashion





Disposition





- Disposition


Disposition: HOSPITALIZED


Disposition Time: 15:02


Condition: STABLE





- Clinical Impression


Clinical Impression: 


 Failing arteriovenous fistula








- Scribe Statement


The provider has reviewed the documentation as recorded by the Scribe (Spencer Coombs)


All medical record entries made by the Scribe were at my direction and 

personally dictated by me. I have reviewed the chart and agree that the record 

accurately reflects my personal performance of the history, physical exam, 

medical decision making, and the department course for this patient. I have 

also personally directed, reviewed, and agree with the discharge instructions 

and disposition.

## 2018-02-10 NOTE — CP.PCM.CON
History of Present Illness





- History of Present Illness


History of Present Illness: 





Vascular Surgery Consult for Dr. Mock


Reason for consult: Bleeding from AVF access site





67 F with PMH that includes ESRD on HD, presents to [facility] with complaints of 

bleeding from fistula site. Patient states she receives HD on MWF. She reports 

that if they stick her in certain places it will continue to bleed. Patient 

says that she has to show dialysis nurse where to get access. Patient was at 

dialysis yesterday when it began to bleed. She applied ointment and bandage to 

the area. The area began bleeding when the bandage was removed this morning and 

took over twenty mins to control. She was then sent to hospital from nursing 

home. Patient admits to some soreness in the area but has no other complaints.





PMH: Anxiety, Arthritis, CHF, Diverticulitis, Fibromyalgia, history of multiple 

Fractures (Current right leg fx, RUE, LLE), HTN, Osteoporosis, Pancreatitis, 

ESRD on HD TIA


Meds: As per EMR


Allergy: ASA, lactose


PSH: AVF, Cholecystectomy


FH:unknown


Social: former smoker, denies Etoh/illicit drug use





Review of Systems





- Review of Systems


All systems: reviewed and no additional remarkable complaints except (as per HPI

)





Past Patient History





- Infectious Disease


Hx of Infectious Diseases: None





- Past Medical History & Family History


Past Medical History?: Yes





- Past Social History


Smoking Status: Never Smoked





- CARDIAC


Hx Congestive Heart Failure: Yes


Hx Hypertension: Yes





- PULMONARY


Hx Respiratory Disorders: No





- NEUROLOGICAL


Hx Transient Ischemic Attacks (TIA): Yes





- HEENT


Hx HEENT Problems: No





- RENAL


Hx Chronic Kidney Disease: Yes





- ENDOCRINE/METABOLIC


Hx Endocrine Disorders: No





- HEMATOLOGICAL/ONCOLOGICAL


Hx Blood Disorders: Yes


Hx Blood Transfusions: Yes


Hx Blood Transfusion Reaction: Yes


Hx Shingles: Yes (right arm)





- INTEGUMENTARY


Hx Dermatological Problems: No





- MUSCULOSKELETAL/RHEUMATOLOGICAL


Hx Arthritis: Yes (OA)


Hx Fractures: Yes (Current right leg fx, RUE, LLE)


Hx Osteoporosis: Yes





- GASTROINTESTINAL


Hx Diverticulitis: Yes


Hx Pancreatitis: Yes





- GENITOURINARY/GYNECOLOGICAL


Hx Genitourinary Disorders: No


Other/Comment: DIALYSIS PATIENT





- PSYCHIATRIC


Hx Anxiety: Yes


Hx Substance Use: No





- SURGICAL HISTORY


Hx Cholecystectomy: Yes





- ANESTHESIA


Hx Anesthesia: Yes


Hx Anesthesia Reactions: No


Hx Malignant Hyperthermia: No





Meds


Allergies/Adverse Reactions: 


 Allergies











Allergy/AdvReac Type Severity Reaction Status Date / Time


 


aspirin Allergy  NAUSEA Verified 11/21/17 17:14


 


lactose Allergy  NAUSEA Verified 11/21/17 17:14














Physical Exam





- Constitutional


Appears: No Acute Distress





- Head Exam


Head Exam: ATRAUMATIC, NORMOCEPHALIC





- Eye Exam


Eye Exam: Normal appearance





- ENT Exam


ENT Exam: Mucous Membranes Moist





- Respiratory Exam


Respiratory Exam: NORMAL BREATHING PATTERN





- Cardiovascular Exam


Cardiovascular Exam: REGULAR RHYTHM





- GI/Abdominal Exam


GI & Abdominal Exam: Normal Bowel Sounds, Soft.  absent: Tenderness





- Extremities Exam


Extremities exam: Positive for: normal capillary refill


Additional comments: 





LUE AVF with palpable thrill and bruit - noticeable pseudoaneurysms formation, 

multiple area of skin break down from HD access


bandage on arm with no evidence of bleeding


peripheral pulses in upper extremity intact





- Neurological Exam


Neurological exam: Alert, CN II-XII Intact, Oriented x3





- Psychiatric Exam


Psychiatric exam: Normal Affect, Normal Mood





- Skin


Skin Exam: Dry, Warm





Results





- Vital Signs


Recent Vital Signs: 


 Last Vital Signs











Temp  97.8 F   02/10/18 13:40


 


Pulse  84   02/10/18 13:40


 


Resp  20   02/10/18 13:40


 


BP  127/62   02/10/18 13:40


 


Pulse Ox  94 L  02/10/18 15:05














- Labs


Result Diagrams: 


 02/10/18 15:34





 02/10/18 15:34


Labs: 


 Laboratory Results - last 24 hr











  02/10/18 02/10/18 02/10/18





  15:34 15:34 15:34


 


WBC  7.1  D  


 


RBC  3.24 L  


 


Hgb  11.0  D  


 


Hct  32.9 L  


 


MCV  101.4 H D  


 


MCH  33.9 H  


 


MCHC  33.4  


 


RDW  16.9 H  


 


Plt Count  246  D  


 


MPV  7.6  


 


Neut % (Auto)  61.5  


 


Lymph % (Auto)  16.0 L  


 


Mono % (Auto)  18.4 H  


 


Eos % (Auto)  3.3  


 


Baso % (Auto)  0.8  


 


Neut # (Auto)  4.4  


 


Lymph # (Auto)  1.1  


 


Mono # (Auto)  1.3 H  


 


Eos # (Auto)  0.2  


 


Baso # (Auto)  0.1  


 


Sodium   132 


 


Potassium   5.1 


 


Chloride   94 L 


 


Carbon Dioxide   28 


 


Anion Gap   15 


 


BUN   42 H 


 


Creatinine   4.1 H 


 


Est GFR ( Amer)   13 


 


Est GFR (Non-Af Amer)   11 


 


Random Glucose   95 


 


Calcium   8.5 L 


 


Total Bilirubin   0.8 


 


AST   57 H D 


 


ALT   15 


 


Alkaline Phosphatase   863 H D 


 


Total Protein   7.7 


 


Albumin   3.3 L 


 


Globulin   4.3 H 


 


Albumin/Globulin Ratio   0.8 L 


 


Blood Type    O POSITIVE


 


Antibody Screen    Negative














Assessment & Plan





- Assessment and Plan (Free Text)


Plan: 





67 F with wound on LUE AVF





-NPO past mn for permacath placement


-Management as per primary


-Further recs as per Dr. Nish Santana PGY1

## 2018-02-11 LAB
ALBUMIN SERPL-MCNC: 3.3 G/DL (ref 3.5–5)
ALBUMIN/GLOB SERPL: 0.7 {RATIO} (ref 1–2.1)
ALT SERPL-CCNC: 22 U/L (ref 9–52)
APTT BLD: 35 SECONDS (ref 21–34)
AST SERPL-CCNC: 41 U/L (ref 14–36)
BASOPHILS # BLD AUTO: 0.1 K/UL (ref 0–0.2)
BASOPHILS NFR BLD: 1 % (ref 0–2)
BUN SERPL-MCNC: 50 MG/DL (ref 7–17)
CALCIUM SERPL-MCNC: 8.7 MG/DL (ref 8.6–10.4)
EOSINOPHIL # BLD AUTO: 0.3 K/UL (ref 0–0.7)
EOSINOPHIL NFR BLD: 3.7 % (ref 0–4)
ERYTHROCYTE [DISTWIDTH] IN BLOOD BY AUTOMATED COUNT: 17.2 % (ref 11.5–14.5)
GFR NON-AFRICAN AMERICAN: 8
HGB BLD-MCNC: 10.7 G/DL (ref 11–16)
INR PPP: 1.1
LYMPHOCYTES # BLD AUTO: 1.2 K/UL (ref 1–4.3)
LYMPHOCYTES NFR BLD AUTO: 16.8 % (ref 20–40)
MCH RBC QN AUTO: 33.8 PG (ref 27–31)
MCHC RBC AUTO-ENTMCNC: 33.3 G/DL (ref 33–37)
MCV RBC AUTO: 101.5 FL (ref 81–99)
MONOCYTES # BLD: 1.3 K/UL (ref 0–0.8)
MONOCYTES NFR BLD: 17.9 % (ref 0–10)
NEUTROPHILS # BLD: 4.3 K/UL (ref 1.8–7)
NEUTROPHILS NFR BLD AUTO: 60.6 % (ref 50–75)
NRBC BLD AUTO-RTO: 0.1 % (ref 0–2)
PLATELET # BLD: 266 K/UL (ref 130–400)
PMV BLD AUTO: 7.6 FL (ref 7.2–11.7)
PROTHROMBIN TIME: 12.5 SECONDS (ref 9.7–12.2)
RBC # BLD AUTO: 3.16 MIL/UL (ref 3.8–5.2)
WBC # BLD AUTO: 7 K/UL (ref 4.8–10.8)

## 2018-02-11 PROCEDURE — B543ZZA ULTRASONOGRAPHY OF RIGHT JUGULAR VEINS, GUIDANCE: ICD-10-PCS | Performed by: SURGERY

## 2018-02-11 PROCEDURE — 05HM33Z INSERTION OF INFUSION DEVICE INTO RIGHT INTERNAL JUGULAR VEIN, PERCUTANEOUS APPROACH: ICD-10-PCS | Performed by: SURGERY

## 2018-02-11 RX ADMIN — METOPROLOL SUCCINATE SCH MG: 100 TABLET, EXTENDED RELEASE ORAL at 14:31

## 2018-02-11 RX ADMIN — OXYCODONE HYDROCHLORIDE AND ACETAMINOPHEN PRN TAB: 5; 325 TABLET ORAL at 18:03

## 2018-02-11 RX ADMIN — CALCIUM CARBONATE-VITAMIN D TAB 500 MG-200 UNIT SCH TAB: 500-200 TAB at 14:31

## 2018-02-11 RX ADMIN — OXYCODONE HYDROCHLORIDE AND ACETAMINOPHEN PRN TAB: 5; 325 TABLET ORAL at 23:29

## 2018-02-11 NOTE — RAD
HISTORY:

s/p permacath placement  



COMPARISON:

Chest x-ray performed 2/11/18 at 912 hours 



TECHNIQUE:

Chest, one view.



FINDINGS:

Right IJ approach dialysis catheter.



LUNGS:

Moderate interstitial prominence may reflect infection or edema. 

Small right pleural effusion.  Bibasilar atelectasis/infiltrates.  No 

definite pneumothorax. 



CARDIOVASCULAR:

Cardiomegaly.



OSSEOUS STRUCTURES:

 No acute osseous abnormality identified.



VISUALIZED UPPER ABDOMEN:

Left subclavian/ axillary vascular stent.



OTHER FINDINGS:

None.



IMPRESSION:

Right IJ approach dialysis catheter with distal tips at the 

cavoatrial junction/right atrium. Moderate interstitial prominence 

may reflect infection or edema. Small right pleural effusion.  

Bibasilar atelectasis/infiltrates.

## 2018-02-11 NOTE — PCM.SURG1
Surgeon's Initial Post Op Note





- Surgeon's Notes


Surgeon: Dr. Mock


Assistant:  PGY1


Type of Anesthesia: General LMA


Pre-Operative Diagnosis: End stage renal disease; chronic kidney disease


Operative Findings: RIJ well visualized on US. for details see op note


Post-Operative Diagnosis: as above


Operation Performed: Right IJ permacath placement, with micropuncture needle 

ultrasound guided, under flouroscopy


Specimen/Specimens Removed: none


Estimated Blood Loss: EBL {In ML}: 10


Drains Used: No Drains


Date of Surgery/Procedure: 02/11/18


Time of Surgery/Procedure: 11:23

## 2018-02-11 NOTE — RAD
HISTORY:

pre op  



COMPARISON:

Chest x-ray performed 11/21/17 



TECHNIQUE:

Chest, one view.



FINDINGS:

Examination limited by habitus.



LUNGS:

Moderate interstitial prominence consistent with edema or infection. 

Bilateral lower lobe pleural effusions and/or consolidations. 



Please note that chest x-ray has limited sensitivity for the 

detection of pulmonary masses.



PLEURA:

No significant pleural effusion identified. No definite pneumothorax .



CARDIOVASCULAR:

Cardiomegaly. Atherosclerotic calcifications of the aorta. 



OSSEOUS STRUCTURES:

 Degenerative changes.



VISUALIZED UPPER ABDOMEN:

Unremarkable.



OTHER FINDINGS:

Partially imaged subclavian/ axillary stent.



IMPRESSION:

Moderate interstitial prominence consistent with edema or infection. 

Bilateral lower lobe pleural effusions and/or consolidations. 

Cardiomegaly. Additional findings as above.

## 2018-02-11 NOTE — CP.PCM.PN
Subjective





- Date & Time of Evaluation


Date of Evaluation: 02/11/18


Time of Evaluation: 13:16





- Subjective


Subjective: 





had another accses for dialysis cath insered r side seen post operative has 

slight cough





Objective





- Vital Signs/Intake and Output


Vital Signs (last 24 hours): 


 











Temp Pulse Resp BP Pulse Ox


 


 97.1 F L  86   13   123/54 L  97 


 


 02/11/18 12:07  02/11/18 12:07  02/11/18 12:07  02/11/18 12:07  02/11/18 12:07








Intake and Output: 


 











 02/11/18 02/11/18





 06:59 18:59


 


Intake Total  720


 


Balance  720














- Medications


Medications: 


 Current Medications





Pneumococcal Polyvalent Vaccine (Pneumovax 23 Vaccine)  0.5 ml IM .ONCE ONE


   Stop: 02/13/18 11:01











- Labs


Labs: 


 





 02/11/18 09:15 





 02/11/18 09:15 





 











PT  12.5 SECONDS (9.7-12.2)  H  02/11/18  09:15    


 


INR  1.1   02/11/18  09:15    


 


APTT  35 SECONDS (21-34)  H  02/11/18  09:15    














- Constitutional


Appears: Non-toxic





- Head Exam


Head Exam: NORMAL INSPECTION





- Eye Exam


Eye Exam: Normal appearance





- ENT Exam


ENT Exam: Mucous Membranes Moist





- Neck Exam


Neck Exam: Full ROM





- Respiratory Exam


Respiratory Exam: Decreased Breath Sounds





- Cardiovascular Exam


Cardiovascular Exam: REGULAR RHYTHM





- GI/Abdominal Exam


GI & Abdominal Exam: Normal Bowel Sounds





- Extremities Exam


Extremities Exam: Normal Inspection





- Back Exam


Back Exam: NORMAL INSPECTION





- Neurological Exam


Neurological Exam: Normal Gait





- Psychiatric Exam


Psychiatric exam: Normal Affect





- Skin


Skin Exam: Pallor





Assessment and Plan





- Assessment and Plan (Free Text)


Assessment: 





s/p bleding ulcer dialysis graft s/p insertion of cath r  esrf cough 

hypoxeamia


Plan: 





as per orders cont med dialysis tomoro then d/c home with oxygen and pt at home

## 2018-02-11 NOTE — OP
PROCEDURE DATE:  02/11/2018.



PREOPERATIVE DIAGNOSES:  Renal failure, aneurysmal fistula, recent

bleeding.



POSTOPERATIVE DIAGNOSES:  Renal failure, aneurysmal fistula, recent

bleeding.



PROCEDURE CARRIED OUT:  Placement of PermCath in right jugular vein with

C-arm fluoroscopy with ultrasound-guided puncture and micropuncture

technique.



SURGEON:  Dr. Mock.



ASSISTANT:  Dr. Kyle.



ANESTHESIOLOGIST:  Dr. Lyon.



TYPE OF ANESTHESIA:  General.



INDICATIONS:  A 67-year-old woman with renal insufficiency, who has had

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  She refused to

have the fistula ligated or revised at this time.  A PermCath was placed so

that the fistula could be _____.  Nonetheless, she has had previous central

vein stenosis and I think that the most opportune thing to do would be

place an entirely new access, but she declines this.



DESCRIPTION OF PROCEDURE:  The patient was given anesthesia and

antibiotics.  Using ultrasound guidance and micropuncture technique, the

jugular vein was cannulated in the neck on the right side.  A guidewire was

advanced centrally, exchanged for a #35 wire through a catheter.  Then a

new catheter deployed at the appropriate location straight down on the

chest wall, it originated on the right chest wall, was tunneled under the

skin, went to the jugular vein and _____ in the superior vena cava.  This

flushed with heparinized saline with good return and the operation carried

out.  PermCath right jugular vein with C-arm fluoroscopy and

ultrasound-guided puncture and micropuncture technique.



Ultrasound images of the neck showed the vein was 16 mm in diameter with

normal compressibility and no evidence of intraluminal thrombosis.







__________________________________________

Sandip Mock Jr., MD





DD:  02/11/2018 11:24:54

DT:  02/11/2018 13:07:15

Job # [id number]

## 2018-02-12 LAB
ALBUMIN SERPL-MCNC: 3.4 G/DL (ref 3.5–5)
ALBUMIN/GLOB SERPL: 0.7 {RATIO} (ref 1–2.1)
ALT SERPL-CCNC: 21 U/L (ref 9–52)
AST SERPL-CCNC: 38 U/L (ref 14–36)
BUN SERPL-MCNC: 62 MG/DL (ref 7–17)
CALCIUM SERPL-MCNC: 8.6 MG/DL (ref 8.6–10.4)
GFR NON-AFRICAN AMERICAN: 7

## 2018-02-12 PROCEDURE — 03WY03Z REVISION OF INFUSION DEVICE IN UPPER ARTERY, OPEN APPROACH: ICD-10-PCS | Performed by: SURGERY

## 2018-02-12 PROCEDURE — 05763DZ DILATION OF LEFT SUBCLAVIAN VEIN WITH INTRALUMINAL DEVICE, PERCUTANEOUS APPROACH: ICD-10-PCS | Performed by: SURGERY

## 2018-02-12 RX ADMIN — HYDROMORPHONE HYDROCHLORIDE PRN MG: 1 INJECTION, SOLUTION INTRAMUSCULAR; INTRAVENOUS; SUBCUTANEOUS at 13:56

## 2018-02-12 RX ADMIN — OXYCODONE HYDROCHLORIDE AND ACETAMINOPHEN PRN TAB: 5; 325 TABLET ORAL at 04:56

## 2018-02-12 RX ADMIN — METOPROLOL SUCCINATE SCH MG: 100 TABLET, EXTENDED RELEASE ORAL at 10:02

## 2018-02-12 RX ADMIN — CALCIUM CARBONATE-VITAMIN D TAB 500 MG-200 UNIT SCH: 500-200 TAB at 11:16

## 2018-02-12 RX ADMIN — OXYCODONE HYDROCHLORIDE AND ACETAMINOPHEN PRN TAB: 5; 325 TABLET ORAL at 21:48

## 2018-02-12 RX ADMIN — HYDROMORPHONE HYDROCHLORIDE PRN MG: 1 INJECTION, SOLUTION INTRAMUSCULAR; INTRAVENOUS; SUBCUTANEOUS at 13:25

## 2018-02-12 RX ADMIN — OXYCODONE HYDROCHLORIDE AND ACETAMINOPHEN PRN TAB: 5; 325 TABLET ORAL at 15:56

## 2018-02-12 NOTE — CARD
--------------- APPROVED REPORT --------------





EKG Measurement

Heart [id number]

FL 100P

[id number]

[id number]

[id number]



<Conclusion>

Sinus rhythm with short FL

Left axis deviation

Right bundle branch block

Inferior infarct, age undetermined

Abnormal ECG

## 2018-02-12 NOTE — RAD
PROCEDURE:  Intraoperative Fluoroscopy. 



HISTORY:

RENAL FAILURE



FINDINGS:

Fluoroscopic assistance was provided for left arm hybrid graft. 

Please refer to the operative report from JIMMY Jimenez. 

Total exam DLP: 21.28. (mGy)



 Total fluoroscopic time (continuous mode) utilized during the 

procedure: 210.9. Seconds

## 2018-02-12 NOTE — CP.PCM.CON
History of Present Illness





- History of Present Illness


History of Present Illness: 





Consulted for HD





67 F with PMH that includes ESRD on HD, presents to [facility] with complaints of 

bleeding from fistula site. Patient states she receives HD on MWF. She reports 

that if they stick her in certain places it will continue to bleed. Patient 

says that she has to show dialysis nurse where to get access. Patient was at 

dialysis friday when it began to bleed. She applied ointment and bandage to the 

area. The area began bleeding when the bandage was removed this morning and 

took over twenty mins to control. 


Pt received a permcath for hd access.





PMH: Anxiety, Arthritis, CHF, Diverticulitis, Fibromyalgia, history of multiple 

Fractures (Current right leg fx, RUE, LLE), HTN, Osteoporosis, Pancreatitis, 

ESRD on HD TIA


Meds: As per EMR


Allergy: ASA, lactose


PSH: AVF, Cholecystectomy


FH:unknown


Social: former smoker, denies Etoh/illicit drug use





Past Patient History





- Infectious Disease


Hx of Infectious Diseases: None





- Past Medical History & Family History


Past Medical History?: Yes





- Past Social History


Smoking Status: Never Smoked





- CARDIAC


Hx Congestive Heart Failure: Yes


Hx Hypertension: Yes





- PULMONARY


Hx Respiratory Disorders: No





- NEUROLOGICAL


Hx Neurological Disorder: Yes


Hx Transient Ischemic Attacks (TIA): Yes





- HEENT


Hx HEENT Problems: No





- RENAL


Date of Last Dialysis Treatment: 02/09/18





- ENDOCRINE/METABOLIC


Hx Endocrine Disorders: No





- HEMATOLOGICAL/ONCOLOGICAL


Hx Blood Disorders: Yes


Hx Blood Transfusions: Yes


Hx Blood Transfusion Reaction: Yes


Hx Shingles: Yes (right arm)





- INTEGUMENTARY


Hx Dermatological Problems: No





- MUSCULOSKELETAL/RHEUMATOLOGICAL


Hx Falls: Yes





- GASTROINTESTINAL


Hx Gastrointestinal Disorders: Yes


Hx Diverticulitis: Yes


Hx Pancreatitis: Yes





- GENITOURINARY/GYNECOLOGICAL


Hx Genitourinary Disorders: No


Other/Comment: DIALYSIS PATIENT





- PSYCHIATRIC


Hx Substance Use: No





- SURGICAL HISTORY


Hx Surgeries: Yes


Hx Cholecystectomy: Yes





- ANESTHESIA


Hx Anesthesia: Yes


Hx Anesthesia Reactions: No


Hx Malignant Hyperthermia: No





Meds


Allergies/Adverse Reactions: 


 Allergies











Allergy/AdvReac Type Severity Reaction Status Date / Time


 


aspirin Allergy  NAUSEA Verified 11/21/17 17:14


 


lactose Allergy  NAUSEA Verified 11/21/17 17:14














- Medications


Medications: 


 Current Medications





Ascorbic Acid (Vitamin C 500 Mg Tab)  1,500 mg PO DAILY [facility]


   Last Admin: 02/12/18 11:16 Dose:  Not Given


Calcium/Vitamin D (Oyster Shell Calcium/Vitamin D 500 Mg-200 Iu)  1 tab PO 

DAILY [facility]


   Last Admin: 02/12/18 11:16 Dose:  Not Given


Clonidine HCl (Catapres)  0.1 mg PO HS [facility]


   Last Admin: 02/11/18 21:57 Dose:  Not Given


Docusate Sodium (Colace)  100 mg PO BID [facility]


   Last Admin: 02/12/18 11:16 Dose:  Not Given


Metoprolol Succinate (Toprol Xl)  100 mg PO DAILY [facility]


   Last Admin: 02/12/18 10:02 Dose:  100 mg


Oxycodone/Acetaminophen (Percocet 5/325 Mg Tab)  1 tab PO Q4H PRN


   PRN Reason: Pain, severe (8-10)


   Stop: 02/14/18 13:22


   Last Admin: 02/12/18 04:56 Dose:  1 tab


Pneumococcal Polyvalent Vaccine (Pneumovax 23 Vaccine)  0.5 ml IM .ONCE ONE


   Stop: 02/13/18 11:01


Sertraline HCl (Zoloft)  50 mg PO Q12 [facility]


   Last Admin: 02/12/18 11:16 Dose:  Not Given


Sodium Polystyrene Sulfonate (Kayexalate Susp)  15 gm PO ONCE PRN


   PRN Reason: K ABOVE 5.5


   Last Admin: 02/11/18 18:03 Dose:  15 gm











Physical Exam





- Constitutional


Appears: Non-toxic, No Acute Distress, Chronically Ill





- Head Exam


Head Exam: NORMAL INSPECTION





- Eye Exam


Eye Exam: Normal appearance, PERRL





- ENT Exam


ENT Exam: Mucous Membranes Moist, Normal Exam





- Respiratory Exam


Respiratory Exam: Clear to Auscultation Bilateral, NORMAL BREATHING PATTERN





- Cardiovascular Exam


Cardiovascular Exam: REGULAR RHYTHM, RRR (rt chest permcath)





- GI/Abdominal Exam


GI & Abdominal Exam: Distended, Soft





- Extremities Exam


Extremities exam: Positive for: normal inspection (rt leg in boot)





- Neurological Exam


Neurological exam: Alert, CN II-XII Intact, Oriented x3





- Skin


Skin Exam: Dry, Normal Color, Warm





Results





- Vital Signs


Recent Vital Signs: 


 Last Vital Signs











Temp  97.9 F   02/12/18 07:50


 


Pulse  79   02/12/18 07:50


 


Resp  20   02/12/18 07:50


 


BP  120/65   02/12/18 07:50


 


Pulse Ox  96   02/12/18 07:50














- Labs


Result Diagrams: 


 02/11/18 09:15





 02/11/18 09:15





Assessment & Plan


(1) Failing arteriovenous fistula


Status: Acute   





(2) ESRD (end stage renal disease) on dialysis


Status: Acute   





- Assessment and Plan (Free Text)


Assessment: 





esrd


anemia of renal dz


avf bleeding





hd today and mwf


use permcath


surgery on board


hgb at goal


bp controlled

## 2018-02-12 NOTE — PCM.SURG1
Surgeon's Initial Post Op Note





- Surgeon's Notes


Surgeon: Dr. Mock


Assistant: Magali Donahue, PGY-1; Shelbie Rodriguez OMS-III; Phil Soria OMS-III


Type of Anesthesia: General Endo


Pre-Operative Diagnosis: Failed AVF of Left extremity


Operative Findings: See op report


Post-Operative Diagnosis: Failed AVF of Left upper extremity


Operation Performed: Left upper extremity AVF revision with Hybrid graft


Specimen/Specimens Removed: None


Estimated Blood Loss: EBL {In ML}: 250


Blood Products Given: N/A


Drains Used: No Drains


Post-Op Condition: Good


Date of Surgery/Procedure: 02/12/18


Time of Surgery/Procedure: 13:10

## 2018-02-12 NOTE — RAD
PROCEDURE:  Intraoperative Fluoroscopy. 



HISTORY:

RENAL FAILURE



FINDINGS:

Fluoroscopic assistance was provided for right-sided PermCath 

placement. Please refer to the operative report from JIMMY Jimenez.

## 2018-02-13 VITALS
OXYGEN SATURATION: 97 % | HEART RATE: 81 BPM | RESPIRATION RATE: 18 BRPM | DIASTOLIC BLOOD PRESSURE: 66 MMHG | SYSTOLIC BLOOD PRESSURE: 145 MMHG | TEMPERATURE: 98.7 F

## 2018-02-13 RX ADMIN — OXYCODONE HYDROCHLORIDE AND ACETAMINOPHEN PRN TAB: 5; 325 TABLET ORAL at 02:52

## 2018-02-13 RX ADMIN — OXYCODONE HYDROCHLORIDE AND ACETAMINOPHEN PRN TAB: 5; 325 TABLET ORAL at 09:10

## 2018-02-13 RX ADMIN — METOPROLOL SUCCINATE SCH MG: 100 TABLET, EXTENDED RELEASE ORAL at 09:10

## 2018-02-13 RX ADMIN — OXYCODONE HYDROCHLORIDE AND ACETAMINOPHEN PRN TAB: 5; 325 TABLET ORAL at 15:12

## 2018-02-13 RX ADMIN — CALCIUM CARBONATE-VITAMIN D TAB 500 MG-200 UNIT SCH TAB: 500-200 TAB at 09:10

## 2018-02-13 NOTE — CP.PCM.PN
Subjective





- Date & Time of Evaluation


Date of Evaluation: 02/13/18


Time of Evaluation: 11:15





- Subjective


Subjective: 





NP NOTES 


Patient seen today, denies any chest pain, sob, no bleeding  noted from the 

fistula site, c/o cough non productive 


s/p AV fistula revision and permacath placement


seen by Keegan Hahn, stable for discharge to [facility] an dDr. Keegan will 

follow the patient  at [facility]  





Objective





- Vital Signs/Intake and Output


Vital Signs (last 24 hours): 


 











Temp Pulse Resp BP Pulse Ox


 


 98.2 F   87   20   121/59 L  98 


 


 02/13/18 07:00  02/13/18 07:00  02/13/18 07:00  02/13/18 07:00  02/13/18 07:00











- Medications


Medications: 


 Current Medications





Ascorbic Acid (Vitamin C 500 Mg Tab)  1,500 mg PO DAILY [facility]


   Last Admin: 02/13/18 09:10 Dose:  1,500 mg


Calcium/Vitamin D (Oyster Shell Calcium/Vitamin D 500 Mg-200 Iu)  1 tab PO 

DAILY [facility]


   Last Admin: 02/13/18 09:10 Dose:  1 tab


Clonidine HCl (Catapres)  0.1 mg PO HS [facility]


   Last Admin: 02/12/18 21:41 Dose:  0.1 mg


Docusate Sodium (Colace)  100 mg PO BID [facility]


   Last Admin: 02/13/18 09:10 Dose:  100 mg


Metoprolol Succinate (Toprol Xl)  100 mg PO DAILY [facility]


   Last Admin: 02/13/18 09:10 Dose:  100 mg


Oxycodone/Acetaminophen (Percocet 5/325 Mg Tab)  1 tab PO Q4H PRN


   PRN Reason: Pain, severe (8-10)


   Stop: 02/14/18 13:22


   Last Admin: 02/13/18 09:10 Dose:  1 tab


Sertraline HCl (Zoloft)  50 mg PO Q12 [facility]


   Last Admin: 02/13/18 09:10 Dose:  50 mg


Sodium Polystyrene Sulfonate (Kayexalate Susp)  15 gm PO ONCE PRN


   PRN Reason: K ABOVE 5.5


   Last Admin: 02/11/18 18:03 Dose:  15 gm











- Labs


Labs: 


 





 02/11/18 09:15 





 02/12/18 11:08 





 











PT  12.5 SECONDS (9.7-12.2)  H  02/11/18  09:15    


 


INR  1.1   02/11/18  09:15    


 


APTT  35 SECONDS (21-34)  H  02/11/18  09:15

## 2018-02-13 NOTE — CP.PCM.PN
Subjective





- Date & Time of Evaluation


Date of Evaluation: 02/13/18


Time of Evaluation: 06:30





- Subjective


Subjective: 





Vascular Surgery- Dr. Mock





Patient seen and exmained at bedside. pain around incision site. well 

controlled at this time w/ current pain regiment. 5/5 muscle strength in LUE. 

No new complaints. denies weakness or numbness in LUE





Objective





- Vital Signs/Intake and Output


Vital Signs (last 24 hours): 


 











Temp Pulse Resp BP Pulse Ox


 


 98.2 F   91 H  20   114/60   100 


 


 02/13/18 04:10  02/13/18 04:10  02/13/18 04:10  02/13/18 04:10  02/13/18 04:10











- Medications


Medications: 


 Current Medications





Ascorbic Acid (Vitamin C 500 Mg Tab)  1,500 mg PO DAILY [facility]


   Last Admin: 02/12/18 11:16 Dose:  Not Given


Calcium/Vitamin D (Oyster Shell Calcium/Vitamin D 500 Mg-200 Iu)  1 tab PO 

DAILY [facility]


   Last Admin: 02/12/18 11:16 Dose:  Not Given


Clonidine HCl (Catapres)  0.1 mg PO HS [facility]


   Last Admin: 02/12/18 21:41 Dose:  0.1 mg


Docusate Sodium (Colace)  100 mg PO BID [facility]


   Last Admin: 02/12/18 21:52 Dose:  100 mg


Metoprolol Succinate (Toprol Xl)  100 mg PO DAILY [facility]


   Last Admin: 02/12/18 10:02 Dose:  100 mg


Oxycodone/Acetaminophen (Percocet 5/325 Mg Tab)  1 tab PO Q4H PRN


   PRN Reason: Pain, severe (8-10)


   Stop: 02/14/18 13:22


   Last Admin: 02/13/18 02:52 Dose:  1 tab


Pneumococcal Polyvalent Vaccine (Pneumovax 23 Vaccine)  0.5 ml IM .ONCE ONE


   Stop: 02/13/18 11:01


Sertraline HCl (Zoloft)  50 mg PO Q12 [facility]


   Last Admin: 02/12/18 21:42 Dose:  50 mg


Sodium Polystyrene Sulfonate (Kayexalate Susp)  15 gm PO ONCE PRN


   PRN Reason: K ABOVE 5.5


   Last Admin: 02/11/18 18:03 Dose:  15 gm











- Labs


Labs: 


 





 02/11/18 09:15 





 02/12/18 11:08 





 











PT  12.5 SECONDS (9.7-12.2)  H  02/11/18  09:15    


 


INR  1.1   02/11/18  09:15    


 


APTT  35 SECONDS (21-34)  H  02/11/18  09:15    














- Constitutional


Appears: Non-toxic, No Acute Distress





- Head Exam


Head Exam: ATRAUMATIC





- Respiratory Exam


Respiratory Exam: NORMAL BREATHING PATTERN.  absent: Accessory Muscle Use, 

Respiratory Distress





- Cardiovascular Exam


Cardiovascular Exam: +S1, +S2.  absent: Bradycardia, Tachycardia





- GI/Abdominal Exam


GI & Abdominal Exam: Soft.  absent: Distended, Firm, Guarding, Rigid, Tenderness





- Extremities Exam


Additional comments: 





b/l palpable radial pulses 


dressing C/D/I no strikethrough





- Neurological Exam


Neurological Exam: Alert, Awake, Oriented x3





- Skin


Skin Exam: Intact, Warm





Assessment and Plan





- Assessment and Plan (Free Text)


Assessment: 





67F s/p Left upper extremity AVF revision with Hybrid graft w/ intraoperative 

ateriogram POD 1; RIJ permacath placement POD # 2


Plan: 





- pain control prn


- encourage L UE mobility and use


- monitor dressing site


- cleared for dialysis from permacath


- cleared for discharge from a surgical standpoint


- further recs per Dr. Mock surgical attending





Julien Kyle PGY1

## 2018-02-13 NOTE — OP
PROCEDURE DATE:  02/12/2018



PREOPERATIVE DIAGNOSIS:  Renal failure, AV fistula left arm.



PROCEDURE CARRIED OUT:  Revision of AV fistula of left arm with

intraoperative arteriogram and creation of hybrid shunt with an angioplasty

and placement of stent in the subclavian vein.



SURGEON:  Sandip Mock Jr., MD



ASSISTANT:  None.



ANESTHESIOLOGIST:  Dr. Ibanez.



INDICATIONS:  The patient is a 67-year-old woman with longstanding access

in the left arm with frequent aneurysm.  She has had central vein problems

as evidenced by previously placed subclavian vein stent, placed elsewhere.



OPERATIVE FINDINGS:  Initially at the beginning of the procedure, we did a

venogram showing the central veins which were quick, prompt, and direct

flow through the previously placed stent without any evidence of

intraluminal stenosis.



DESCRIPTION OF PROCEDURE:  Subsequent to this, we then punctured the  _____

arterial portion of the fistula.  We dissected at the venous portion.  We

punctured the venous portion, deployed a 9 mm stent, brought this through a

subcutaneous tunnel and anastomosed it using loupe magnification, heparin

anticoagulation to the arterial portion.  Prior to this, we carried out a

balloon angioplasty with venous anastomosis and placing a stent showing a 9

mm balloon and a 9 mm stent in diameter and this was quite successfully

deployed.  There was excellent flow through this.  We then completed the

anastomosis, closed the wounds, and terminated the procedure.



ESTIMATED BLOOD LOSS IN THE PROCEDURE:  300 mL or more.



OPERATION CARRIED OUT:  Revision of AV fistula left arm with ligation to

get rid of the bleeding aneurysm _____ and then bypassing  _____ hybrid

shunt.





__________________________________________

Sandip Mock Jr., MD



cc:  Ron Silva MD



DD:  02/12/2018 13:10:15

DT:  02/12/2018 21:20:12

Job # [id number]

## 2018-02-13 NOTE — CP.PCM.CON
History of Present Illness





- History of Present Illness


History of Present Illness: 





Pt couldn't be seen yesterday as she was in OR getting avf angioplasty





67 F with PMH that includes ESRD on HD, presents to [facility] with complaints of 

bleeding from fistula site. Patient states she receives HD on MWF. She reports 

that if they stick her in certain places it will continue to bleed. Patient 

says that she has to show dialysis nurse where to get access. Patient was at 

dialysis last week when it began to bleed. She was then sent to hospital from 

nursing home. Patient admits to some soreness in the area but has no other 

complaints.


She had a permcath placed over the weekend and was dialyzed yesterday.


av graft placed yesterday.





PMH: Anxiety, Arthritis, CHF, Diverticulitis, Fibromyalgia, history of multiple 

Fractures (Current right leg fx, RUE, LLE), HTN, Osteoporosis, Pancreatitis, 

ESRD on HD TIA


Meds: As per EMR


Allergy: ASA, lactose


PSH: AVF, Cholecystectomy


FH:unknown


Social: former smoker, denies Etoh/illicit drug use





Review of Systems





- Review of Systems


All systems: reviewed and no additional remarkable complaints except (as per HPI

)





Past Patient History





- Infectious Disease


Hx of Infectious Diseases: None





- Past Medical History & Family History


Past Medical History?: Yes





- Past Social History


Smoking Status: Never Smoked





- CARDIAC


Hx Congestive Heart Failure: Yes


Hx Hypertension: Yes





- PULMONARY


Hx Respiratory Disorders: No





- NEUROLOGICAL


Hx Neurological Disorder: Yes


Hx Transient Ischemic Attacks (TIA): Yes





- HEENT


Hx HEENT Problems: No





- RENAL


Date of Last Dialysis Treatment: 02/09/18





- ENDOCRINE/METABOLIC


Hx Endocrine Disorders: No





- HEMATOLOGICAL/ONCOLOGICAL


Hx Blood Disorders: Yes


Hx Blood Transfusions: Yes


Hx Blood Transfusion Reaction: Yes


Hx Shingles: Yes (right arm)





- INTEGUMENTARY


Hx Dermatological Problems: No





- MUSCULOSKELETAL/RHEUMATOLOGICAL


Hx Falls: Yes





- GASTROINTESTINAL


Hx Gastrointestinal Disorders: Yes


Hx Diverticulitis: Yes


Hx Pancreatitis: Yes





- GENITOURINARY/GYNECOLOGICAL


Hx Genitourinary Disorders: No


Other/Comment: DIALYSIS PATIENT





- PSYCHIATRIC


Hx Substance Use: No





- SURGICAL HISTORY


Hx Surgeries: Yes


Hx Cholecystectomy: Yes





- ANESTHESIA


Hx Anesthesia: Yes


Hx Anesthesia Reactions: No


Hx Malignant Hyperthermia: No





Meds


Allergies/Adverse Reactions: 


 Allergies











Allergy/AdvReac Type Severity Reaction Status Date / Time


 


aspirin Allergy  NAUSEA Verified 11/21/17 17:14


 


lactose Allergy  NAUSEA Verified 11/21/17 17:14














- Medications


Medications: 


 Current Medications





Ascorbic Acid (Vitamin C 500 Mg Tab)  1,500 mg PO DAILY [facility]


   Last Admin: 02/13/18 09:10 Dose:  1,500 mg


Calcium/Vitamin D (Oyster Shell Calcium/Vitamin D 500 Mg-200 Iu)  1 tab PO 

DAILY [facility]


   Last Admin: 02/13/18 09:10 Dose:  1 tab


Clonidine HCl (Catapres)  0.1 mg PO HS [facility]


   Last Admin: 02/12/18 21:41 Dose:  0.1 mg


Docusate Sodium (Colace)  100 mg PO BID [facility]


   Last Admin: 02/13/18 09:10 Dose:  100 mg


Metoprolol Succinate (Toprol Xl)  100 mg PO DAILY [facility]


   Last Admin: 02/13/18 09:10 Dose:  100 mg


Oxycodone/Acetaminophen (Percocet 5/325 Mg Tab)  1 tab PO Q4H PRN


   PRN Reason: Pain, severe (8-10)


   Stop: 02/14/18 13:22


   Last Admin: 02/13/18 09:10 Dose:  1 tab


Pneumococcal Polyvalent Vaccine (Pneumovax 23 Vaccine)  0.5 ml IM .ONCE ONE


   Stop: 02/13/18 11:01


Sertraline HCl (Zoloft)  50 mg PO Q12 [facility]


   Last Admin: 02/13/18 09:10 Dose:  50 mg


Sodium Polystyrene Sulfonate (Kayexalate Susp)  15 gm PO ONCE [facility]


   PRN Reason: K ABOVE 5.5


   Last Admin: 02/11/18 18:03 Dose:  15 gm











Physical Exam





- Constitutional


Appears: Non-toxic, No Acute Distress





- Head Exam


Head Exam: NORMAL INSPECTION





- Eye Exam


Eye Exam: Normal appearance, PERRL


Pupil Exam: PERRL





- ENT Exam


ENT Exam: Mucous Membranes Moist, Normal Exam





- Neck Exam


Neck exam: Positive for: Normal Inspection





- Respiratory Exam


Respiratory Exam: Clear to Auscultation Bilateral, NORMAL BREATHING PATTERN





- Cardiovascular Exam


Cardiovascular Exam: REGULAR RHYTHM, RRR





- GI/Abdominal Exam


GI & Abdominal Exam: Distended, Normal Bowel Sounds, Soft





- Extremities Exam


Extremities exam: Positive for: normal inspection (lt foot in boot), pedal edema


Additional comments: 





lue arm in dressing, tender to plapation





- Neurological Exam


Neurological exam: Alert, Oriented x3





- Psychiatric Exam


Psychiatric exam: Normal Affect, Normal Mood





- Skin


Skin Exam: Dry, Intact, Normal Color





Results





- Vital Signs


Recent Vital Signs: 


 Last Vital Signs











Temp  98.2 F   02/13/18 07:00


 


Pulse  87   02/13/18 07:00


 


Resp  20   02/13/18 07:00


 


BP  121/59 L  02/13/18 07:00


 


Pulse Ox  98   02/13/18 07:00














- Labs


Result Diagrams: 


 02/11/18 09:15





 02/12/18 11:08


Labs: 


 Laboratory Results - last 24 hr











  02/12/18





  11:08


 


Sodium  134


 


Potassium  5.5 H


 


Chloride  96 L


 


Carbon Dioxide  24


 


Anion Gap  20


 


BUN  62 H


 


Creatinine  6.2 H


 


Est GFR ( Amer)  8


 


Est GFR (Non-Af Amer)  7


 


Random Glucose  92


 


Calcium  8.6


 


Total Bilirubin  0.9


 


AST  38 H


 


ALT  21


 


Alkaline Phosphatase  834 H


 


Total Protein  8.0


 


Albumin  3.4 L


 


Globulin  4.6 H


 


Albumin/Globulin Ratio  0.7 L














Assessment & Plan


(1) Failing arteriovenous fistula


Status: Acute   





(2) ESRD (end stage renal disease) on dialysis


Status: Acute   





(3) Anemia


Status: Acute   





(4) HTN (hypertension)


Status: Acute   





- Assessment and Plan (Free Text)


Assessment: 





maintain hd mwf, use permcath


s/p av graft 


bp acceptable


pain control


debbie w/hd

## 2018-02-13 NOTE — CP.PCM.PN
Subjective





- Date & Time of Evaluation


Date of Evaluation: 02/13/18


Time of Evaluation: 11:23





- Subjective


Subjective: 





pt stable confirtable on oxygen slight cough no more bleeding fromarm





Objective





- Vital Signs/Intake and Output


Vital Signs (last 24 hours): 


 











Temp Pulse Resp BP Pulse Ox


 


 98.2 F   87   20   121/59 L  98 


 


 02/13/18 07:00  02/13/18 07:00  02/13/18 07:00  02/13/18 07:00  02/13/18 07:00











- Medications


Medications: 


 Current Medications





Ascorbic Acid (Vitamin C 500 Mg Tab)  1,500 mg PO DAILY [facility]


   Last Admin: 02/13/18 09:10 Dose:  1,500 mg


Calcium/Vitamin D (Oyster Shell Calcium/Vitamin D 500 Mg-200 Iu)  1 tab PO 

DAILY [facility]


   Last Admin: 02/13/18 09:10 Dose:  1 tab


Clonidine HCl (Catapres)  0.1 mg PO HS [facility]


   Last Admin: 02/12/18 21:41 Dose:  0.1 mg


Docusate Sodium (Colace)  100 mg PO BID [facility]


   Last Admin: 02/13/18 09:10 Dose:  100 mg


Metoprolol Succinate (Toprol Xl)  100 mg PO DAILY [facility]


   Last Admin: 02/13/18 09:10 Dose:  100 mg


Oxycodone/Acetaminophen (Percocet 5/325 Mg Tab)  1 tab PO Q4H PRN


   PRN Reason: Pain, severe (8-10)


   Stop: 02/14/18 13:22


   Last Admin: 02/13/18 09:10 Dose:  1 tab


Sertraline HCl (Zoloft)  50 mg PO Q12 [facility]


   Last Admin: 02/13/18 09:10 Dose:  50 mg


Sodium Polystyrene Sulfonate (Kayexalate Susp)  15 gm PO ONCE [facility]


   PRN Reason: K ABOVE 5.5


   Last Admin: 02/11/18 18:03 Dose:  15 gm











- Labs


Labs: 


 





 02/11/18 09:15 





 02/12/18 11:08 





 











PT  12.5 SECONDS (9.7-12.2)  H  02/11/18  09:15    


 


INR  1.1   02/11/18  09:15    


 


APTT  35 SECONDS (21-34)  H  02/11/18  09:15    














- Constitutional


Appears: Non-toxic





- Head Exam


Head Exam: ATRAUMATIC





- Eye Exam


Eye Exam: Normal appearance


Pupil Exam: PERRL





- ENT Exam


ENT Exam: Mucous Membranes Moist





- Neck Exam


Neck Exam: Full ROM





- Respiratory Exam


Respiratory Exam: Clear to Ausculation Bilateral





- Cardiovascular Exam


Cardiovascular Exam: REGULAR RHYTHM





- GI/Abdominal Exam


GI & Abdominal Exam: Normal Bowel Sounds





- Rectal Exam


Rectal Exam: NORMAL INSPECTION





- Extremities Exam


Extremities Exam: Normal Inspection





- Back Exam


Back Exam: NORMAL INSPECTION





- Neurological Exam


Neurological Exam: Alert, Awake, Oriented x3





- Psychiatric Exam


Psychiatric exam: Normal Affect





- Skin


Skin Exam: Pallor





Assessment and Plan





- Assessment and Plan (Free Text)


Assessment: 





esrf s/p bleeding l armdialysis grft s/p new accses for dialysis cough s/p fxs 


Plan: 





trnsfer back to [facility]

## 2018-03-10 NOTE — DS
HISTORY OF PRESENT ILLNESS:  The patient came in from nursing home.  She

has end-stage renal failure and she has a shunt which was bleeding.  She

had an ulcer in the shunt and it was bleeding, so she was transferred to

the hospital.  At the time of admission, her vital signs were stable.  She

has no fever.  Her lungs were clear.  She has a history of _____ arthritis,

congestive heart failure, diverticulitis, fibromyalgia, fractures of arms

and legs.



HOSPITAL COURSE:  The patient was admitted and Dr. Mock was called for

consultation.  Dr. Mock did right Perm-a-Cath placement with

micropuncture needle ultrasound-guided under fluoroscopy and the patient

tolerated the procedure well and she was started on dialysis again.  She

has no more bleeding.  She was stable on oxygen, and she was transferred

back to the nursing home to continue her care as well as her dialysis.



DIAGNOSES:

1.  End-stage renal failure.

2.  Bleeding ulcer of the shunt.

3.  Placement of a new access for dialysis.

4.  Anemia.

5.  Anxiety.

6.  Short of breath, hypoxic.



__________________________________________

Sulma Allison MD





DD:  03/09/2018 9:37:45

DT:  03/09/2018 12:04:39

Job # [id number]

## 2018-04-12 ENCOUNTER — HOSPITAL ENCOUNTER (INPATIENT)
Dept: HOSPITAL 31 - C.ER | Age: 68
LOS: 7 days | Discharge: HOME | DRG: 871 | End: 2018-04-19
Attending: INTERNAL MEDICINE | Admitting: INTERNAL MEDICINE
Payer: MEDICARE

## 2018-04-12 VITALS — BODY MASS INDEX: 23.8 KG/M2

## 2018-04-12 DIAGNOSIS — I33.0: ICD-10-CM

## 2018-04-12 DIAGNOSIS — N25.81: ICD-10-CM

## 2018-04-12 DIAGNOSIS — J44.9: ICD-10-CM

## 2018-04-12 DIAGNOSIS — D64.9: ICD-10-CM

## 2018-04-12 DIAGNOSIS — M79.7: ICD-10-CM

## 2018-04-12 DIAGNOSIS — N18.6: ICD-10-CM
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
## 2018-04-12 DIAGNOSIS — Z99.2: ICD-10-CM

## 2018-04-12 DIAGNOSIS — I13.2: ICD-10-CM

## 2018-04-12 DIAGNOSIS — B95.62: ICD-10-CM

## 2018-04-12 DIAGNOSIS — K35.80: ICD-10-CM

## 2018-04-12 DIAGNOSIS — Z99.81: ICD-10-CM

## 2018-04-12 DIAGNOSIS — A41.02: Primary | ICD-10-CM

## 2018-04-12 DIAGNOSIS — Z87.891: ICD-10-CM

## 2018-04-12 DIAGNOSIS — Z86.73: ICD-10-CM

## 2018-04-12 DIAGNOSIS — I42.9: ICD-10-CM

## 2018-04-12 DIAGNOSIS — I50.9: ICD-10-CM

## 2018-04-12 DIAGNOSIS — M81.0: ICD-10-CM

## 2018-04-12 DIAGNOSIS — T86.12: ICD-10-CM

## 2018-04-12 LAB
ALBUMIN SERPL-MCNC: 3.4 [, G/DL] (ref 3.5–5)
ALBUMIN/GLOB SERPL: 0.8 [,] (ref 1–2.1)
ALT SERPL-CCNC: 28 [, U/L] (ref 9–52)
ANISOCYTOSIS BLD QL SMEAR: SLIGHT [,]
APTT BLD: 33 [, SECONDS] (ref 21–34)
AST SERPL-CCNC: 63 [, U/L] (ref 14–36)
BASOPHILS # BLD AUTO: 0.1 [, K/UL] (ref 0–0.2)
BASOPHILS NFR BLD: 0.9 [, %] (ref 0–2)
BUN SERPL-MCNC: 42 [, MG/DL] (ref 7–17)
CALCIUM SERPL-MCNC: 8.9 [, MG/DL] (ref 8.6–10.4)
EOSINOPHIL # BLD AUTO: 0.7 [, K/UL] (ref 0–0.7)
EOSINOPHIL NFR BLD: 11 [, %] (ref 0–4)
EOSINOPHIL NFR BLD: 7.8 [, %] (ref 0–4)
ERYTHROCYTE [DISTWIDTH] IN BLOOD BY AUTOMATED COUNT: 15.8 [, %] (ref 11.5–14.5)
GFR NON-AFRICAN AMERICAN: 10 [,]
HGB BLD-MCNC: 9.9 [, G/DL] (ref 11–16)
HYPOCHROMIC: SLIGHT [,]
INR PPP: 1.2 [,]
LYMPHOCYTE: 8 [, %] (ref 20–40)
LYMPHOCYTES # BLD AUTO: 1.1 [, K/UL] (ref 1–4.3)
LYMPHOCYTES NFR BLD AUTO: 12.1 [, %] (ref 20–40)
MCH RBC QN AUTO: 31.5 [, PG] (ref 27–31)
MCHC RBC AUTO-ENTMCNC: 33.5 [, G/DL] (ref 33–37)
MCV RBC AUTO: 94 [, FL] (ref 81–99)
MONOCYTE: 18 [, %] (ref 0–10)
MONOCYTES # BLD: 1.8 [, K/UL] (ref 0–0.8)
MONOCYTES NFR BLD: 20.8 [, %] (ref 0–10)
NEUTROPHILS # BLD: 5.2 [, K/UL] (ref 1.8–7)
NEUTROPHILS NFR BLD AUTO: 58.4 [, %] (ref 50–75)
NEUTROPHILS NFR BLD AUTO: 62 [, %] (ref 50–75)
NRBC BLD AUTO-RTO: 0.1 [, %] (ref 0–2)
PLATELET # BLD EST: NORMAL [,]
PLATELET # BLD: 214 [, K/UL] (ref 130–400)
PMV BLD AUTO: 8.2 [, FL] (ref 7.2–11.7)
PROTHROMBIN TIME: 12.9 [, SECONDS] (ref 9.7–12.2)
RBC # BLD AUTO: 3.16 [, MIL/UL] (ref 3.8–5.2)
TOTAL CELLS COUNTED BLD: 100 [,]
VARIANT LYMPHS NFR BLD MANUAL: 1 [, %] (ref 0–0)
WBC # BLD AUTO: 8.8 [, K/UL] (ref 4.8–10.8)

## 2018-04-12 NOTE — C.PDOC
History Of Present Illness


66 y/o female with history of ESRD with dialysis (T, Thu, Sat) presents to ED 

sent by Dr. Silva for positive blood culture. Patient last received dialysis 2 

days ago and reports chills for "couple of days". Patient denies chest pain, sob

, fever, nausea, vomiting or any other complaints at this time. 


Chief Complaint (Nursing): Medical Clearance


History Per: Patient


History/Exam Limitations: no limitations


Onset/Duration Of Symptoms: Days


Current Symptoms Are (Timing): Still Present





Past Medical History


Reviewed: Historical Data, Nursing Documentation, Vital Signs


Vital Signs: 


 Last Vital Signs











Temp  99 F   04/12/18 16:25


 


Pulse  73   04/12/18 16:25


 


Resp  20   04/12/18 16:25


 


BP  145/64   04/12/18 16:25


 


Pulse Ox  99   04/12/18 16:25














- Medical History


PMH: Anxiety, Arthritis (OA), CHF, Diverticulitis, Fibromyalgia, Fractures (

Current right leg fx, RUE, LLE), HTN, Osteoporosis, Pancreatitis, End Stage 

Renal Disease, Chronic Kidney Disease, TIA


Surgical History: Cholecystectomy





- [facility] Procedures








 (11/06/17)


BUNIONECT/SFT/OSTEOTOMY (12/13/13)


DILATION OF L SUBCLAV VEIN WITH INTRALUM DEV, PERC APPROACH (02/10/18)


IMMOBILIZATION OF LEFT LOWER LEG USING SPLINT (11/06/17)


IMMOBILIZATION OF RIGHT UPPER EXTREMITY USING SPLINT (11/06/17)


INSERT INFUSION DEV IN R INT JUGULAR VEIN, PERC (02/10/18)


OPEN REDUCT-INT FIX TOE (12/13/13)


REVISION OF INFUSION DEVICE IN UPPER ARTERY, OPEN APPROACH (02/10/18)


ULTRASONOGRAPHY OF RIGHT JUGULAR VEINS, GUIDANCE (02/10/18)








Family History: States: No Known Family Hx





- Social History


Hx Alcohol Use: No


Hx Substance Use: No





- Immunization History


Hx Tetanus Toxoid Vaccination: No


Hx Influenza Vaccination: No


Hx Pneumococcal Vaccination: No





Review Of Systems


Constitutional: Positive for: Chills.  Negative for: Fever


Cardiovascular: Negative for: Chest Pain


Respiratory: Negative for: Shortness of Breath


Gastrointestinal: Negative for: Nausea, Vomiting


Neurological: Negative for: Weakness, Numbness





Physical Exam





- Physical Exam


Appears: Non-toxic, No Acute Distress


Skin: Normal Color, Warm, Dry, No Rash


Head: Atraumatic, Normacephalic


Oral Mucosa: Moist


Neck: Normal ROM, Supple


Cardiovascular: Rhythm Regular


Respiratory: Rales (At bases bilaterally), No Rhonchi, No Wheezing


Gastrointestinal/Abdominal: Soft, No Tenderness, No Guarding, No Rebound


Extremity: Pedal Edema (Bilateral ), Capillary Refill (<2 seconds), No Deformity

, Other (Both upper extremities AV fistula intact)


Neurological/Psych: Oriented x3, Normal Speech, Normal Cognition





ED Course And Treatment





- Laboratory Results


Result Diagrams: 


 04/12/18 13:32





 04/12/18 13:32


O2 Sat by Pulse Oximetry: 88 (RA)


Pulse Ox Interpretation: Normal


Progress Note: Spoke to Dr. Silva, advised patient receive 1 dose of 

Vancomycin after dialysis today.  Spoke to Dr. Arita aware of patient and 

instructed patient be admitted to general medicine floor





Disposition





- Disposition


Disposition Time: 14:00


Condition: STABLE





- Clinical Impression


Clinical Impression: 


 ESRD (end stage renal disease) on dialysis, Positive blood culture








- Scribe Statement


The provider has reviewed the documentation as recorded by the Kimiibdiego Junior





All medical record entries made by the Kimiibdiego were at my direction and 

personally dictated by me. I have reviewed the chart and agree that the record 

accurately reflects my personal performance of the history, physical exam, 

medical decision making, and the department course for this patient. I have 

also personally directed, reviewed, and agree with the discharge instructions 

and disposition.

## 2018-04-13 PROCEDURE — 5A1D70Z PERFORMANCE OF URINARY FILTRATION, INTERMITTENT, LESS THAN 6 HOURS PER DAY: ICD-10-PCS

## 2018-04-13 RX ADMIN — METOPROLOL SUCCINATE SCH MG: 100 TABLET, EXTENDED RELEASE ORAL at 09:20

## 2018-04-13 NOTE — CP.PCM.CON
History of Present Illness





- History of Present Illness


History of Present Illness: 





68 y/o female with history of ESRD with dialysis (T, Thu, Sat) presents to ED 

sent by Dr. Silva for positive blood culture. Patient last received dialysis 2 

days ago and reports chills for "couple of days". Patient denies chest pain, sob

, fever, nausea, vomiting or any other complaints at this time. 





IV antibiotics in progress   await cultures and sensitivities    may need HD 

cath change after line holiday 








- Medical History


PMH: Anxiety, Arthritis (OA), CHF, Diverticulitis, Fibromyalgia, Fractures (

Current right leg fx, RUE, LLE), HTN, Osteoporosis, Pancreatitis, End Stage 

Renal Disease, Chronic Kidney Disease, TIA


Surgical History: Cholecystectomy





- [facility] Procedures








 (11/06/17)


BUNIONECT/SFT/OSTEOTOMY (12/13/13)


DILATION OF L SUBCLAV VEIN WITH INTRALUM DEV, PERC APPROACH (02/10/18)


IMMOBILIZATION OF LEFT LOWER LEG USING SPLINT (11/06/17)


IMMOBILIZATION OF RIGHT UPPER EXTREMITY USING SPLINT (11/06/17)


INSERT INFUSION DEV IN R INT JUGULAR VEIN, PERC (02/10/18)


OPEN REDUCT-INT FIX TOE (12/13/13)


REVISION OF INFUSION DEVICE IN UPPER ARTERY, OPEN APPROACH (02/10/18)


ULTRASONOGRAPHY OF RIGHT JUGULAR VEINS, GUIDANCE (02/10/18)








Review of Systems





- Review of Systems


All systems: reviewed and no additional remarkable complaints except





- Constitutional


Constitutional: As Per HPI





- EENT


Eyes: absent: As Per HPI, Blind Spots, Blurred Vision, Change in Vision, 

Decreased Night Vision, Diplopia, Discharge, Dry Eye, Exophthalmos, Floaters, 

Irritation, Itchy Eyes, Loss of Peripheral Vision, Pain, Photophobia, Requires 

Corrective Lenses, Sees Flashes, Spots in Vision, Tunnel Vision, Other Visual 

Disturbances, Loss of Vision, Other


Ears: absent: As Per HPI, Decreased Hearing, Ear Discharge, Ear Pain, Tinnitus, 

Abnormal Hearing, Disequilibrium, Dizziness, Other


Nose/Mouth/Throat: absent: As Per HPI, Epistaxis, Nasal Congestion, Nasal 

Discharge, Nasal Obstruction, Nasal Trauma, Nose Pain, Post Nasal Drip, Sinus 

Pain, Sinus Pressure, Bleeding Gums, Change in Voice, Dental Pain, Dry Mouth, 

Dysphagia, Halitosis, Hoarsness, Lip Swelling, Mouth Lesions, Mouth Pain, 

Odynophagia, Sore Throat, Throat Swelling, Tongue Swelling, Facial Pain, Neck 

Pain, Neck Mass, Other





- Breasts


Breasts: absent: As Per HPI, Change in Shape, Mass, Pain, Nipple Discharge, 

Nipple Inversion, Skin Changes, Swelling, Other





- Cardiovascular


Cardiovascular: As Per HPI





- Respiratory


Respiratory: As Per HPI, Cough, Dyspnea.  absent: Hemoptysis





- Gastrointestinal


Gastrointestinal: absent: As Per HPI, Abdominal Pain, Belching, Bloating, 

Change in Bowel Habits, Change in Stool Character, Coffee Ground Emesis, 

Constipation, Cramping, Diarrhea, Dyspepsia, Dysphagia, Early Satiety, 

Excessive Flatus, Fecal Incontinence, Heartburn, Hematemesis, Hematochezia, 

Loose Stools, Melena, Nausea, Odynophagia, Temesmus, Vomiting, Other





- Genitourinary


Genitourinary: absent: As Per HPI, Change in Urinary Stream, Difficulty 

Urinating, Dysuria, Flank Pain, Hematuria, Pyuria, Nocturia, Urinary 

Incontinence, Urinary Frequency, Urinary Hesitance, Urinary Urgency, Voiding 

Freq/Small Amts, Freq UTI, Hx Renal/Bladder Calculi, Hx /Renal Surgery, 

Bladder Distension, Other





- Reproductive: Female


Reproductive:Female: absent: As Per HPI, Amenorrhea, Amenorrhea/Birth Control, 

Currently Menstual, Cycle <21 Days, Cycle >35 Days, Cycle Variable, Menses 1-7 

Days, Menses >/= 8 Days, Menses Variable, Cycle > 4 Weeks Between, No Menses 

for 6 Months, Heavy Menses, Light Menses, Normal Menses, Spotting Between Cycles

, S/P Hysterectomy, Menopausal, Post Menopausal, Premenarche, Abnormal Vaginal 

Bleeding, Dysmenorrhea, Dyspareunia, Genital Lesions, Genital Pruritis, Pelvic 

Pain, Prolapse Symptoms, Sexual Dysfunction, Vaginal Discharge, Vaginal Dryness

, Vaginal Odor, Vaginal Pruritis, Other





- Menstruation


Menstruation: absent: As Per HPI, Amenorrhea, Amenorrhea/Birth Control, 

Currently Menstual, Cycle <21 Days, Cycle >35 Days, Cycle Variable, Menses 1-7 

Days, Menses >/= 8 Days, Menses Variable, Cycle > 4 Weeks Between, No Menses 

for 6 Months, Heavy Menses, Light Menses, Normal Menses, Spotting Between Cycles

, S/P Hysterectomy, Menopausal, Post Menopausal, Premenarche, Abnormal Vaginal 

Bleeding, Dysmenorrhea, Other





- Musculoskeletal


Musculoskeletal: absent: As Per HPI, Abnormal Gait, Arthralgias, Atrophy, Back 

Pain, Deformity, Joint Swelling, Limited Range of Motion, Loss of Height, 

Muscle Cramps, Muscle Weakness, Myalgias, Neck Pain, Numbness, Radiating Pain 

into Limb, Stiffness, Tingling, Other





- Integumentary


Integumentary: absent: As Per HPI, Acne, Alopecia, Bleeding Lesions, Change in 

Hair, Change in Nails, Change in Pigmentation, Changing Lesions, Dry Skin, 

Erythema, Furuncle, Hirsutism, Lesions, New Lesions, Non-Healing Lesions, 

Photosensitivity, Pruritus, Rash, Skin Pain, Skin Ulcer, Sores, Striae, Swelling

, Unusual Bruising, Wounds, Jaundice, Other





- Neurological


Neurological: absent: As Per HPI, Abnormal Gait, Abnormal Hearing, Abnormal 

Movements, Abnormal Speech, Behavioral Changes, Burning Sensations, Confusion, 

Convulsions, Disequilibrium, Dizziness, Numbness, Focal Weakness, Frequent Falls

, Headaches, Lack of Coordination, Loss of Vision, Memory Loss, Paresthesias, 

Radicular Pain, Restless Legs, Sensory Deficit, Syncope, Tingling, Tremor, 

Vertigo, Weakness, Other Visual Disturbances, Other





- Psychiatric


Psychiatric: absent: As Per HPI, Abnormal Sleep Pattern, Anhedonia, Anxiety, 

Auditory Hallucinations, Behavioral Changes, Change in Appetite, Change in 

Libido, Confusion, Depression, Difficulty Concentrating, Hallucinations, 

Homicidal Ideation, Hopelessness, Irritability, Memory Loss, Mood Swings, Panic 

Attacks, Paranoia, Suicidal Ideation, Visual Hallucinations, Tactile 

Hallucinations, Other





- Endocrine


Endocrine: absent: As Per HPI, Change in Body Appearance, Change in Libido, 

Cold Intolorance, Deepening of Voice, Excessive Sweating, Fatigue, Flushing, 

Heat Intolorance, Increase in Ring/Shoe/Hat Size, Palpitations, Polydipsia, 

Polyphagia, Polyuria, Other





- Hematologic/Lymphatic


Hematologic: absent: As Per HPI, Easy Bleeding, Easy Bruising, Lymphadenopathy, 

Other





Past Patient History





- Infectious Disease


Hx of Infectious Diseases: None





- Past Medical History & Family History


Past Medical History?: Yes





- Past Social History


Smoking Status: Former Smoker


Chewing Tobacco Use: No


Cigar Use: No


Alcohol: None


Drugs: Denies


Home Situation {Lives}: Nursing Home





- CARDIAC


Hx Congestive Heart Failure: Yes


Hx Hypertension: Yes





- PULMONARY


Hx Respiratory Disorders: No





- NEUROLOGICAL


Hx Transient Ischemic Attacks (TIA): Yes





- HEENT


Hx HEENT Problems: No





- RENAL


Hx Chronic Kidney Disease: Yes


Type of Dialysis Access: R chest perma cath


Date of Last Dialysis Treatment: 04/12/18





- ENDOCRINE/METABOLIC


Hx Endocrine Disorders: No





- HEMATOLOGICAL/ONCOLOGICAL


Hx Blood Disorders: Yes





- INTEGUMENTARY


Hx Dermatological Problems: No





- MUSCULOSKELETAL/RHEUMATOLOGICAL


Hx Arthritis: Yes (OA)


Hx Falls: Yes


Hx Fractures: Yes (Current right leg fx, RUE, LLE)


Hx Osteoporosis: Yes





- GASTROINTESTINAL


Hx Diverticulitis: Yes


Hx Pancreatitis: Yes





- GENITOURINARY/GYNECOLOGICAL


Hx Genitourinary Disorders: No





- PSYCHIATRIC


Hx Anxiety: Yes


Hx Substance Use: No





- SURGICAL HISTORY


Hx Cholecystectomy: Yes





- ANESTHESIA


Hx Anesthesia: Yes


Hx Anesthesia Reactions: No


Hx Malignant Hyperthermia: No





Meds


Allergies/Adverse Reactions: 


 Allergies











Allergy/AdvReac Type Severity Reaction Status Date / Time


 


aspirin Allergy  NAUSEA Verified 04/12/18 12:54


 


lactose Allergy  NAUSEA Verified 04/12/18 12:54














- Medications


Medications: 


 Current Medications





Ascorbic Acid (Vitamin C 500 Mg Tab)  1,500 mg PO DAILY [facility]


   Last Admin: 04/13/18 09:21 Dose:  1,500 mg


Cinacalcet (Sensipar)  30 mg PO DAILY [facility]


   Last Admin: 04/13/18 09:21 Dose:  30 mg


Epoetin Jeff (Procrit)  10,000 unit IV TTS [facility]


Heparin Sodium (Porcine) (Heparin)  5,000 units SC Q12 [facility]


   Last Admin: 04/13/18 09:21 Dose:  5,000 units


Vancomycin/Sodium Chloride (Vancomycin 1 Gm/Ns 200 Ml)  1 gm in 200 mls @ 166.7 

mls/hr IVPB TTS [facility]


   PRN Reason: Protocol


   Stop: 04/19/18 10:01


Metoprolol Succinate (Toprol Xl)  100 mg PO DAILY [facility]


   Last Admin: 04/13/18 09:20 Dose:  100 mg


Oxycodone/Acetaminophen (Percocet 5/325 Mg Tab)  1 tab PO Q6H PRN


   PRN Reason: Pain, moderate (4-7)


   Stop: 04/15/18 21:59


Sertraline HCl (Zoloft)  50 mg PO BID [facility]


   Last Admin: 04/13/18 09:20 Dose:  50 mg











Physical Exam





- Constitutional


Appears: Non-toxic, Chronically Ill





- Head Exam


Head Exam: NORMOCEPHALIC





- Eye Exam


Eye Exam: PERRL.  absent: Scleral icterus





- ENT Exam


ENT Exam: Mucous Membranes Dry, Normal External Ear Exam





- Neck Exam


Neck exam: Negative for: Lymphadenopathy





- Respiratory Exam


Respiratory Exam: Decreased Breath Sounds, Prolonged Expiratory Phase, Rhonchi





- Cardiovascular Exam


Cardiovascular Exam: Tachycardia, REGULAR RHYTHM, +S1, +S2





- GI/Abdominal Exam


GI & Abdominal Exam: Diminished Bowel Sounds, Distended, Soft.  absent: 

Tenderness





- Rectal Exam


Rectal Exam: Deferred





-  Exam


 Exam: NORMAL INSPECTION





- Extremities Exam


Extremities exam: Positive for: pedal pulses present.  Negative for: calf 

tenderness, pedal edema, tenderness





- Back Exam


Back exam: absent: CVA tenderness (L), CVA tenderness (R), paraspinal tenderness





- Neurological Exam


Neurological exam: Alert, CN II-XII Intact, Oriented x3, Reflexes Normal





- Psychiatric Exam


Psychiatric exam: Depressed





- Skin


Skin Exam: Dry





Results





- Vital Signs


Recent Vital Signs: 


 Last Vital Signs











Temp  97.8 F   04/13/18 09:00


 


Pulse  72   04/13/18 09:00


 


Resp  20   04/13/18 09:00


 


BP  128/68   04/13/18 09:00


 


Pulse Ox  95   04/13/18 09:00














- Labs


Result Diagrams: 


 04/12/18 13:32





 04/12/18 13:32





Assessment & Plan


(1) Bacteremia due to Gram-positive bacteria


Status: Acute   





(2) CHF (congestive heart failure)


Status: Acute   





(3) ESRD (end stage renal disease) on dialysis


Status: Acute   





(4) Positive blood culture


Status: Acute   





(5) Appendicitis


Status: Acute   





(6) Bacteremia due to Gram-positive bacteria


Status: Acute   





(7) CHF (congestive heart failure)


Status: Acute   





(8) End stage renal disease


Status: Acute   





- Assessment and Plan (Free Text)


Assessment: 





cont iv antibiotics 


may need HD cath removal and ? NIKKI





has hx strep sepsis

## 2018-04-13 NOTE — CP.PCM.PN
Subjective





- Date & Time of Evaluation


Date of Evaluation: 04/13/18


Time of Evaluation: 11:00





- Subjective


Subjective: 





PGY 2 Medicine Note- Dr. Arita's Service





67 year old  female with past medical history significant for ESRD 

requiring Hemodialysis presents for further workup. Patient seen and examined 

in no acute distress. Patient states that she was told that she has an 

infection in her blood by her kidney doctor. She states that she had a similar 

issue several months prior. She admits to experiencing chills several days 

prior. She denies any current fevers , nausea, vomiting, diarrhea or 

constipation at this time.





PMHx- ESRD


PSHx- kidney transplant in 2002


Fam Hx- Mom and grandma had uterine cancer. Father -liver diseasw


Social Hx- Patient does not smoke but admits to second hand smoke via her 

 denies alcohol use; denies illcit drug use


Allergies- ASA- anaphylaxis; lactose intolerant. 





Objective





- Vital Signs/Intake and Output


Vital Signs (last 24 hours): 


 











Temp Pulse Resp BP Pulse Ox


 


 97.8 F   72   20   128/68   95 


 


 04/13/18 09:00  04/13/18 09:00  04/13/18 09:00  04/13/18 09:00  04/13/18 09:00








Intake and Output: 


 











 04/13/18 04/13/18





 06:59 18:59


 


Intake Total 100 


 


Balance 100 














- Medications


Medications: 


 Current Medications





Ascorbic Acid (Vitamin C 500 Mg Tab)  1,500 mg PO DAILY [facility]


   Last Admin: 04/13/18 09:21 Dose:  1,500 mg


Cinacalcet (Sensipar)  30 mg PO DAILY [facility]


   Last Admin: 04/13/18 09:21 Dose:  30 mg


Heparin Sodium (Porcine) (Heparin)  5,000 units SC Q12 [facility]


   Last Admin: 04/13/18 09:21 Dose:  5,000 units


Vancomycin/Sodium Chloride (Vancomycin 1 Gm/Ns 200 Ml)  1 gm in 200 mls @ 166.7 

mls/hr IVPB TTS [facility]


   PRN Reason: Protocol


   Stop: 04/19/18 10:01


Metoprolol Succinate (Toprol Xl)  100 mg PO DAILY [facility]


   Last Admin: 04/13/18 09:20 Dose:  100 mg


Oxycodone/Acetaminophen (Percocet 5/325 Mg Tab)  1 tab PO Q6H PRN


   PRN Reason: Pain, moderate (4-7)


   Stop: 04/15/18 21:59


Sertraline HCl (Zoloft)  50 mg PO BID [facility]


   Last Admin: 04/13/18 09:20 Dose:  50 mg











- Labs


Labs: 


 





 04/12/18 13:32 





 04/12/18 13:32 





 











PT  12.9 SECONDS (9.7-12.2)  H  04/12/18  13:32    


 


INR  1.2   04/12/18  13:32    


 


APTT  33 SECONDS (21-34)   04/12/18  13:32    














- Constitutional


Appears: Non-toxic





- Head Exam


Head Exam: ATRAUMATIC, NORMAL INSPECTION





- Eye Exam


Eye Exam: EOMI, Normal appearance


Pupil Exam: NORMAL ACCOMODATION





- ENT Exam


ENT Exam: Mucous Membranes Moist





- Neck Exam


Neck Exam: Full ROM





- Respiratory Exam


Respiratory Exam: NORMAL BREATHING PATTERN.  absent: Wheezes





- Cardiovascular Exam


Cardiovascular Exam: +S1, +S2





- GI/Abdominal Exam


GI & Abdominal Exam: Soft, Normal Bowel Sounds





- Extremities Exam


Extremities Exam: Full ROM, Normal Capillary Refill





- Neurological Exam


Neurological Exam: Alert, Awake





- Psychiatric Exam


Psychiatric exam: Normal Affect, Normal Mood





- Skin


Skin Exam: Normal Color





Assessment and Plan





- Assessment and Plan (Free Text)


Assessment: 





Bacteremia


F/U Blood cultures at this time 


F/U NIKKI Echo


On Vancomycin during dialysis





ESRD (end stage renal disease) on dialysis


on T, Th, Sat


On Sensipar and Epo


Nephrology on the case. F/U recommendations


Need to assess whether dialysis catheter is involved





HTN


Metoprolol daily


Continut to monitor





Prophylactic Measure 


Heparin SC Q12


F/U PT recommendations





Discussed with attending. All planning and management per Dr. Arita.

## 2018-04-13 NOTE — CP.PCM.CON
History of Present Illness





- History of Present Illness


History of Present Illness: 








66 y/o female with history of ESRD with dialysis (T, Thu, Sat) presents to ED  

for positive blood culture- +GPC reported. Patient last received dialysis 2 

days ago and reports chills for "couple of days". Patient denies chest pain, sob

, fever, nausea, vomiting or any other complaints at this time. 


Was at rehab recently for fx left ankle; suffered pneumonia and possible CHF 

recently.





PMH:


ESRD- DIALYSIS X 16 YEARS


FAILED RENAL TRANSPLANT


CMP


COPD


SEC HPT


TIA





PSH:


RENAL TRANSPLANT


AV FISTULA








Review of Systems





- Constitutional


Constitutional: Fatigue, Fever, Weakness





- EENT


Eyes: Blurred Vision


Ears: absent: As Per HPI, Decreased Hearing, Ear Discharge, Ear Pain, Tinnitus, 

Abnormal Hearing, Disequilibrium, Dizziness, Other


Nose/Mouth/Throat: absent: As Per HPI, Epistaxis, Nasal Congestion, Nasal 

Discharge, Nasal Obstruction, Nasal Trauma, Nose Pain, Post Nasal Drip, Sinus 

Pain, Sinus Pressure, Bleeding Gums, Change in Voice, Dental Pain, Dry Mouth, 

Dysphagia, Halitosis, Hoarsness, Lip Swelling, Mouth Lesions, Mouth Pain, 

Odynophagia, Sore Throat, Throat Swelling, Tongue Swelling, Facial Pain, Neck 

Pain, Neck Mass, Other





- Cardiovascular


Cardiovascular: Dyspnea on Exertion, Lightheadedness





- Respiratory


Respiratory: Cough, Dyspnea on Exertion





- Gastrointestinal


Gastrointestinal: Nausea





- Genitourinary


Genitourinary: As Per HPI





- Musculoskeletal


Musculoskeletal: Muscle Cramps, Muscle Weakness, Myalgias





- Neurological


Neurological: Weakness





Past Patient History





- Infectious Disease


Hx of Infectious Diseases: None





- Past Medical History & Family History


Past Medical History?: Yes





- Past Social History


Smoking Status: Former Smoker


Chewing Tobacco Use: No


Cigar Use: No


Alcohol: None


Drugs: Denies


Home Situation {Lives}: Nursing Home





- CARDIAC


Hx Congestive Heart Failure: Yes


Hx Hypertension: Yes





- PULMONARY


Hx Respiratory Disorders: No





- NEUROLOGICAL


Hx Transient Ischemic Attacks (TIA): Yes





- HEENT


Hx HEENT Problems: No





- RENAL


Hx Chronic Kidney Disease: Yes


Type of Dialysis Access: R chest perma cath


Date of Last Dialysis Treatment: 04/12/18





- ENDOCRINE/METABOLIC


Hx Endocrine Disorders: No





- HEMATOLOGICAL/ONCOLOGICAL


Hx Blood Disorders: Yes





- INTEGUMENTARY


Hx Dermatological Problems: No





- MUSCULOSKELETAL/RHEUMATOLOGICAL


Hx Arthritis: Yes (OA)


Hx Falls: Yes


Hx Fractures: Yes (Current right leg fx, RUE, LLE)


Hx Osteoporosis: Yes





- GASTROINTESTINAL


Hx Diverticulitis: Yes


Hx Pancreatitis: Yes





- GENITOURINARY/GYNECOLOGICAL


Hx Genitourinary Disorders: No





- PSYCHIATRIC


Hx Anxiety: Yes


Hx Substance Use: No





- SURGICAL HISTORY


Hx Cholecystectomy: Yes





- ANESTHESIA


Hx Anesthesia: Yes


Hx Anesthesia Reactions: No


Hx Malignant Hyperthermia: No





Meds


Allergies/Adverse Reactions: 


 Allergies











Allergy/AdvReac Type Severity Reaction Status Date / Time


 


aspirin Allergy  NAUSEA Verified 04/12/18 12:54


 


lactose Allergy  NAUSEA Verified 04/12/18 12:54














- Medications


Medications: 


 Current Medications





Ascorbic Acid (Vitamin C 500 Mg Tab)  1,500 mg PO DAILY [facility]


   Last Admin: 04/13/18 09:21 Dose:  1,500 mg


Cinacalcet (Sensipar)  30 mg PO DAILY [facility]


   Last Admin: 04/13/18 09:21 Dose:  30 mg


Heparin Sodium (Porcine) (Heparin)  5,000 units SC Q12 [facility]


   Last Admin: 04/13/18 09:21 Dose:  5,000 units


Vancomycin/Sodium Chloride (Vancomycin 1 Gm/Ns 200 Ml)  1 gm in 200 mls @ 166.7 

mls/hr IVPB TTS [facility]


   PRN Reason: Protocol


   Stop: 04/19/18 10:01


Metoprolol Succinate (Toprol Xl)  100 mg PO DAILY [facility]


   Last Admin: 04/13/18 09:20 Dose:  100 mg


Oxycodone/Acetaminophen (Percocet 5/325 Mg Tab)  1 tab PO Q6H PRN


   PRN Reason: Pain, moderate (4-7)


   Stop: 04/15/18 21:59


Sertraline HCl (Zoloft)  50 mg PO BID [facility]


   Last Admin: 04/13/18 09:20 Dose:  50 mg











Physical Exam





- Constitutional


Appears: No Acute Distress, Chronically Ill





- Head Exam


Head Exam: ATRAUMATIC, NORMAL INSPECTION





- Eye Exam


Eye Exam: EOMI, Normal appearance





- Neck Exam


Neck exam: Positive for: Normal Inspection.  Negative for: Tenderness





- Respiratory Exam


Respiratory Exam: Rhonchi, NORMAL BREATHING PATTERN





- Cardiovascular Exam


Cardiovascular Exam: REGULAR RHYTHM, +S1





- GI/Abdominal Exam


GI & Abdominal Exam: Soft.  absent: Tenderness





- Extremities Exam


Extremities exam: Positive for: normal inspection, tenderness





- Neurological Exam


Neurological exam: Alert, CN II-XII Intact





- Skin


Skin Exam: Dry, Warm





Results





- Vital Signs


Recent Vital Signs: 


 Last Vital Signs











Temp  97.8 F   04/13/18 09:00


 


Pulse  72   04/13/18 09:00


 


Resp  20   04/13/18 09:00


 


BP  128/68   04/13/18 09:00


 


Pulse Ox  95   04/13/18 09:00














- Labs


Result Diagrams: 


 04/12/18 13:32





 04/12/18 13:32


Labs: 


 Laboratory Results - last 24 hr











  04/12/18 04/12/18 04/12/18





  13:32 13:32 13:32


 


WBC  8.8  


 


RBC  3.16 L  


 


Hgb  9.9 L  


 


Hct  29.7 L  


 


MCV  94.0  D  


 


MCH  31.5 H  


 


MCHC  33.5  


 


RDW  15.8 H  


 


Plt Count  214  


 


MPV  8.2  


 


Neut % (Auto)  58.4  


 


Lymph % (Auto)  12.1 L  


 


Mono % (Auto)  20.8 H  


 


Eos % (Auto)  7.8 H  


 


Baso % (Auto)  0.9  


 


Neut # (Auto)  5.2  


 


Lymph # (Auto)  1.1  


 


Mono # (Auto)  1.8 H  


 


Eos # (Auto)  0.7  


 


Baso # (Auto)  0.1  


 


Neutrophils % (Manual)  62  


 


Lymphocytes % (Manual)  8 L  


 


Reactive Lymphs %  1 H  


 


Monocytes % (Manual)  18 H  


 


Eosinophils % (Manual)  11 H  


 


Platelet Estimate  Normal  


 


Hypochromasia (manual)  Slight  


 


Anisocytosis (manual)  Slight  


 


PT   12.9 H 


 


INR   1.2 


 


APTT   33 


 


Sodium    134


 


Potassium    4.5


 


Chloride    87 L


 


Carbon Dioxide    33 H


 


Anion Gap    19


 


BUN    42 H


 


Creatinine    4.5 H


 


Est GFR ( Amer)    12


 


Est GFR (Non-Af Amer)    10


 


Random Glucose    101


 


Calcium    8.9


 


Total Bilirubin    0.9


 


AST    63 H D


 


ALT    28


 


Alkaline Phosphatase    712 H


 


Total Protein    7.7


 


Albumin    3.4 L


 


Globulin    4.2 H


 


Albumin/Globulin Ratio    0.8 L














Assessment & Plan


(1) Bacteremia due to Gram-positive bacteria


Status: Acute   





(2) CHF (congestive heart failure)


Status: Acute   





(3) ESRD (end stage renal disease) on dialysis


Status: Acute   





(4) Bacteremia due to Gram-positive bacteria


Status: Acute   





- Assessment and Plan (Free Text)


Plan: 





IV vanco


repeat cultures


CXR


dialysis with increased UF

## 2018-04-13 NOTE — RAD
HISTORY:



COMPARISON:

02/11/2018.



TECHNIQUE:

Chest PA and lateral



FINDINGS:



LINES AND TUBES:

The right PermCath terminates at the cavoatrial junction. There is 

stable appearance of left subclavian endovascular stent. 



LUNG AND PLEURA:

There is moderate pulmonary venous congestion. 



HEART AND MEDIASTINUM:

There is persistent severe cardiomegaly.  Atherosclerotic aortic arch 

calcifications are present. The hilar and mediastinal contours are 

within normal limits.



SKELETAL STRUCTURES:

The bony structures are within normal limits for the patient's age.



VISUALIZED UPPER ABDOMEN:

Normal.



OTHER FINDINGS:

None.



IMPRESSION:

Right PermCath terminates at the cavoatrial junction. 



Persistent severe cardiomegaly and moderate pulmonary venous 

congestion.

## 2018-04-14 LAB
ALBUMIN SERPL-MCNC: 3.3 [, G/DL] (ref 3.5–5)
ALBUMIN/GLOB SERPL: 0.9 [,] (ref 1–2.1)
ALT SERPL-CCNC: 15 [, U/L] (ref 9–52)
AST SERPL-CCNC: 50 [, U/L] (ref 14–36)
BUN SERPL-MCNC: 33 [, MG/DL] (ref 7–17)
CALCIUM SERPL-MCNC: 7.5 [, MG/DL] (ref 8.6–10.4)
GFR NON-AFRICAN AMERICAN: 11 [,]

## 2018-04-14 RX ADMIN — VANCOMYCIN HYDROCHLORIDE SCH MLS/HR: 1 INJECTION, POWDER, LYOPHILIZED, FOR SOLUTION INTRAVENOUS at 18:00

## 2018-04-14 RX ADMIN — METOPROLOL SUCCINATE SCH: 100 TABLET, EXTENDED RELEASE ORAL at 10:14

## 2018-04-14 RX ADMIN — VANCOMYCIN HYDROCHLORIDE SCH: 1 INJECTION, POWDER, LYOPHILIZED, FOR SOLUTION INTRAVENOUS at 13:37

## 2018-04-14 RX ADMIN — ERYTHROPOIETIN SCH UNIT: 10000 INJECTION, SOLUTION INTRAVENOUS; SUBCUTANEOUS at 14:58

## 2018-04-14 NOTE — CP.PCM.PN
Subjective





- Date & Time of Evaluation


Date of Evaluation: 04/14/18


Time of Evaluation: 10:09





- Subjective


Subjective: 





feels ok


no SOB


afebrile


eating breakfast 





ROS- 10 point ROS negative 





Objective





- Vital Signs/Intake and Output


Vital Signs (last 24 hours): 


 











Temp Pulse Resp BP Pulse Ox


 


 97.6 F   69   20   147/74   97 


 


 04/14/18 08:00  04/14/18 08:00  04/14/18 08:00  04/14/18 08:00  04/14/18 08:00








Intake and Output: 


 











 04/14/18 04/14/18





 06:59 18:59


 


Intake Total 120 


 


Balance 120 














- Medications


Medications: 


 Current Medications





Ascorbic Acid (Vitamin C 500 Mg Tab)  1,500 mg PO DAILY [facility]


   Last Admin: 04/13/18 09:21 Dose:  1,500 mg


Cinacalcet (Sensipar)  30 mg PO DAILY [facility]


   Last Admin: 04/13/18 09:21 Dose:  30 mg


Epoetin Jeff (Procrit)  10,000 unit IV TTS [facility]


Heparin Sodium (Porcine) (Heparin)  5,000 units SC Q12 [facility]


   Last Admin: 04/13/18 21:17 Dose:  Not Given


Vancomycin/Sodium Chloride (Vancomycin 1 Gm/Ns 200 Ml)  1 gm in 200 mls @ 166.7 

mls/hr IVPB TTS [facility]


   PRN Reason: Protocol


   Stop: 04/19/18 10:01


Metoprolol Succinate (Toprol Xl)  100 mg PO DAILY [facility]


   Last Admin: 04/13/18 09:20 Dose:  100 mg


Oxycodone/Acetaminophen (Percocet 5/325 Mg Tab)  1 tab PO Q6H [facility]


   PRN Reason: Pain, moderate (4-7)


   Stop: 04/15/18 21:59


Sertraline HCl (Zoloft)  50 mg PO BID [facility]


   Last Admin: 04/13/18 17:54 Dose:  50 mg











- Labs


Labs: 


 





 04/12/18 13:32 





 04/14/18 06:17 





 











PT  12.9 SECONDS (9.7-12.2)  H  04/12/18  13:32    


 


INR  1.2   04/12/18  13:32    


 


APTT  33 SECONDS (21-34)   04/12/18  13:32    














- Constitutional


Appears: Well, Non-toxic





- Head Exam


Head Exam: ATRAUMATIC, NORMOCEPHALIC





- Eye Exam


Eye Exam: EOMI, PERRL





- ENT Exam


ENT Exam: Mucous Membranes Moist





- Neck Exam


Neck Exam: absent: Lymphadenopathy





- Respiratory Exam


Respiratory Exam: Clear to Ausculation Bilateral.  absent: Accessory Muscle Use

, Rhonchi, Wheezes





- Cardiovascular Exam


Cardiovascular Exam: REGULAR RHYTHM, +S1, +S2





- GI/Abdominal Exam


GI & Abdominal Exam: Soft.  absent: Tenderness





- Extremities Exam


Extremities Exam: absent: Joint Swelling, Pedal Edema





- Neurological Exam


Neurological Exam: Alert, Awake, Oriented x3





- Psychiatric Exam


Psychiatric exam: Normal Affect, Normal Mood





- Skin


Skin Exam: Dry, Intact





Assessment and Plan


(1) Bacteremia due to Gram-positive bacteria


Status: Acute   





(2) CHF (congestive heart failure)


Status: Acute   





(3) ESRD (end stage renal disease) on dialysis


Status: Acute   





(4) Anemia


Status: Acute   





- Assessment and Plan (Free Text)


Plan: 





Dialysis today


vanco post HD


blood cultures from 12th- no growth one day 


to try to use fistula

## 2018-04-15 LAB
ALBUMIN SERPL-MCNC: 3.4 [, G/DL] (ref 3.5–5)
ALBUMIN/GLOB SERPL: 0.8 [,] (ref 1–2.1)
ALT SERPL-CCNC: 13 [, U/L] (ref 9–52)
AST SERPL-CCNC: 53 [, U/L] (ref 14–36)
BASOPHILS # BLD AUTO: 0.1 [, K/UL] (ref 0–0.2)
BASOPHILS NFR BLD: 1.1 [, %] (ref 0–2)
BUN SERPL-MCNC: 19 [, MG/DL] (ref 7–17)
CALCIUM SERPL-MCNC: 8 [, MG/DL] (ref 8.6–10.4)
EOSINOPHIL # BLD AUTO: 0.6 [, K/UL] (ref 0–0.7)
EOSINOPHIL NFR BLD: 8.1 [, %] (ref 0–4)
ERYTHROCYTE [DISTWIDTH] IN BLOOD BY AUTOMATED COUNT: 16.1 [, %] (ref 11.5–14.5)
GFR NON-AFRICAN AMERICAN: 14 [,]
HGB BLD-MCNC: 10.7 [, G/DL] (ref 11–16)
LYMPHOCYTES # BLD AUTO: 1.9 [, K/UL] (ref 1–4.3)
LYMPHOCYTES NFR BLD AUTO: 25 [, %] (ref 20–40)
MCH RBC QN AUTO: 31.5 [, PG] (ref 27–31)
MCHC RBC AUTO-ENTMCNC: 33.3 [, G/DL] (ref 33–37)
MCV RBC AUTO: 94.6 [, FL] (ref 81–99)
MONOCYTES # BLD: 1.2 [, K/UL] (ref 0–0.8)
MONOCYTES NFR BLD: 16.1 [, %] (ref 0–10)
NEUTROPHILS # BLD: 3.7 [, K/UL] (ref 1.8–7)
NEUTROPHILS NFR BLD AUTO: 49.7 [, %] (ref 50–75)
NRBC BLD AUTO-RTO: 0.3 [, %] (ref 0–2)
PLATELET # BLD: 153 [, K/UL] (ref 130–400)
PMV BLD AUTO: 8.1 [, FL] (ref 7.2–11.7)
RBC # BLD AUTO: 3.39 [, MIL/UL] (ref 3.8–5.2)
WBC # BLD AUTO: 7.5 [, K/UL] (ref 4.8–10.8)

## 2018-04-15 RX ADMIN — OXYCODONE HYDROCHLORIDE AND ACETAMINOPHEN PRN TAB: 5; 325 TABLET ORAL at 16:10

## 2018-04-15 RX ADMIN — OXYCODONE HYDROCHLORIDE AND ACETAMINOPHEN PRN TAB: 5; 325 TABLET ORAL at 22:09

## 2018-04-15 RX ADMIN — OXYCODONE HYDROCHLORIDE AND ACETAMINOPHEN PRN TAB: 5; 325 TABLET ORAL at 09:59

## 2018-04-15 RX ADMIN — METOPROLOL SUCCINATE SCH MG: 100 TABLET, EXTENDED RELEASE ORAL at 09:55

## 2018-04-15 NOTE — CP.PCM.PN
Subjective





- Date & Time of Evaluation


Date of Evaluation: 04/15/18


Time of Evaluation: 08:00





- Subjective


Subjective: 





NIKKI requested for positive blood cultures





NIKKI Marilyn afternoon around 2pm








on IV Vanco post HD 





Objective





- Vital Signs/Intake and Output


Vital Signs (last 24 hours): 


 











Temp Pulse Resp BP Pulse Ox


 


 97.3 F L  69   20   138/69   99 


 


 04/15/18 15:00  04/15/18 15:00  04/15/18 15:00  04/15/18 15:00  04/15/18 15:00








Intake and Output: 


 











 04/15/18 04/15/18





 06:59 18:59


 


Intake Total 500 600


 


Output Total  0


 


Balance 500 600














- Medications


Medications: 


 Current Medications





Ascorbic Acid (Vitamin C 500 Mg Tab)  1,500 mg PO DAILY [facility]


   Last Admin: 04/15/18 09:55 Dose:  1,500 mg


Cinacalcet (Sensipar)  30 mg PO DAILY [facility]


   Last Admin: 04/15/18 09:55 Dose:  30 mg


Epoetin Jeff (Procrit)  10,000 unit IV TTS [facility]


   Last Admin: 04/14/18 14:58 Dose:  10,000 unit


Heparin Sodium (Porcine) (Heparin)  5,000 units SC Q12 [facility]


   Last Admin: 04/15/18 09:55 Dose:  5,000 units


Vancomycin/Sodium Chloride (Vancomycin 1 Gm/Ns 200 Ml)  1 gm in 200 mls @ 166.7 

mls/hr IVPB TTS [facility]


   PRN Reason: Protocol


   Stop: 04/19/18 10:01


   Last Admin: 04/14/18 18:00 Dose:  166.7 mls/hr


Metoprolol Succinate (Toprol Xl)  100 mg PO DAILY [facility]


   Last Admin: 04/15/18 09:55 Dose:  100 mg


Oxycodone/Acetaminophen (Percocet 5/325 Mg Tab)  1 tab PO Q6H [facility]


   PRN Reason: Pain, moderate (4-7)


   Stop: 04/15/18 21:59


   Last Admin: 04/15/18 16:10 Dose:  1 tab


Sertraline HCl (Zoloft)  50 mg PO BID [facility]


   Last Admin: 04/15/18 17:35 Dose:  50 mg











- Labs


Labs: 


 





 04/15/18 07:26 





 04/15/18 07:26 





 











PT  12.9 SECONDS (9.7-12.2)  H  04/12/18  13:32    


 


INR  1.2   04/12/18  13:32    


 


APTT  33 SECONDS (21-34)   04/12/18  13:32    














- Constitutional


Appears: Non-toxic, Chronically Ill





- Head Exam


Head Exam: NORMOCEPHALIC





- Eye Exam


Eye Exam: PERRL





- ENT Exam


ENT Exam: Mucous Membranes Dry, Normal External Ear Exam





- Neck Exam


Neck Exam: absent: Lymphadenopathy





- Respiratory Exam


Respiratory Exam: Decreased Breath Sounds, Clear to Ausculation Bilateral





- Cardiovascular Exam


Cardiovascular Exam: REGULAR RHYTHM





- GI/Abdominal Exam


GI & Abdominal Exam: Distended, Soft





Assessment and Plan


(1) Bacteremia due to Gram-positive bacteria


Status: Acute   





(2) CHF (congestive heart failure)


Status: Acute   





(3) ESRD (end stage renal disease) on dialysis


Status: Acute   





(4) Positive blood culture


Status: Acute   





(5) Appendicitis


Status: Acute   





(6) Bacteremia due to Gram-positive bacteria


Status: Acute   





(7) CHF (congestive heart failure)


Status: Acute   





(8) End stage renal disease


Status: Acute   





- Assessment and Plan (Free Text)


Assessment: 





cont iv rx 


for NIKKI

## 2018-04-15 NOTE — CP.PCM.CON
History of Present Illness





- History of Present Illness


History of Present Illness: 





Patient seen and evaluated


NIKKI requested for positive blood cultures





NIKKI Marilyn afternoon around 2pm


NPO after MN except meds








Past Patient History





- Infectious Disease


Hx of Infectious Diseases: None





- Past Medical History & Family History


Past Medical History?: Yes





- Past Social History


Smoking Status: Former Smoker


Chewing Tobacco Use: No


Cigar Use: No


Alcohol: None


Drugs: Denies


Home Situation {Lives}: Nursing Home





- CARDIAC


Hx Congestive Heart Failure: Yes


Hx Hypertension: Yes





- PULMONARY


Hx Respiratory Disorders: No





- NEUROLOGICAL


Hx Transient Ischemic Attacks (TIA): Yes





- HEENT


Hx HEENT Problems: No





- RENAL


Hx Chronic Kidney Disease: Yes


Type of Dialysis Access: R chest perma cath


Date of Last Dialysis Treatment: 04/12/18





- ENDOCRINE/METABOLIC


Hx Endocrine Disorders: No





- HEMATOLOGICAL/ONCOLOGICAL


Hx Blood Disorders: Yes





- INTEGUMENTARY


Hx Dermatological Problems: No





- MUSCULOSKELETAL/RHEUMATOLOGICAL


Hx Arthritis: Yes (OA)





- GASTROINTESTINAL


Hx Diverticulitis: Yes


Hx Pancreatitis: Yes





- GENITOURINARY/GYNECOLOGICAL


Hx Genitourinary Disorders: No





- PSYCHIATRIC


Hx Anxiety: Yes


Hx Substance Use: No





- SURGICAL HISTORY


Hx Cholecystectomy: Yes





- ANESTHESIA


Hx Anesthesia: Yes


Hx Anesthesia Reactions: No


Hx Malignant Hyperthermia: No





Meds


Allergies/Adverse Reactions: 


 Allergies











Allergy/AdvReac Type Severity Reaction Status Date / Time


 


aspirin Allergy  NAUSEA Verified 04/12/18 12:54


 


lactose Allergy  NAUSEA Verified 04/12/18 12:54














- Medications


Medications: 


 Current Medications





Ascorbic Acid (Vitamin C 500 Mg Tab)  1,500 mg PO DAILY [facility]


   Last Admin: 04/14/18 10:09 Dose:  1,500 mg


Cinacalcet (Sensipar)  30 mg PO DAILY [facility]


   Last Admin: 04/14/18 10:10 Dose:  30 mg


Epoetin Jeff (Procrit)  10,000 unit IV TTS [facility]


   Last Admin: 04/14/18 14:58 Dose:  10,000 unit


Heparin Sodium (Porcine) (Heparin)  5,000 units SC Q12 [facility]


   Last Admin: 04/14/18 22:02 Dose:  Not Given


Vancomycin/Sodium Chloride (Vancomycin 1 Gm/Ns 200 Ml)  1 gm in 200 mls @ 166.7 

mls/hr IVPB TTS [facility]


   PRN Reason: Protocol


   Stop: 04/19/18 10:01


   Last Admin: 04/14/18 18:00 Dose:  166.7 mls/hr


Metoprolol Succinate (Toprol Xl)  100 mg PO DAILY [facility]


   Last Admin: 04/14/18 10:14 Dose:  Not Given


Oxycodone/Acetaminophen (Percocet 5/325 Mg Tab)  1 tab PO Q6H [facility]


   PRN Reason: Pain, moderate (4-7)


   Stop: 04/15/18 21:59


Sertraline HCl (Zoloft)  50 mg PO BID [facility]


   Last Admin: 04/14/18 18:00 Dose:  50 mg











Results





- Vital Signs


Recent Vital Signs: 


 Last Vital Signs











Temp  98.4 F   04/15/18 00:00


 


Pulse  76   04/15/18 00:00


 


Resp  20   04/15/18 00:00


 


BP  124/58 L  04/15/18 00:00


 


Pulse Ox  99   04/15/18 00:00














- Labs


Result Diagrams: 


 04/12/18 13:32





 04/14/18 06:17


Labs: 


 Laboratory Results - last 24 hr











  04/14/18





  06:17


 


Sodium  135


 


Potassium  4.7


 


Chloride  94 L


 


Carbon Dioxide  26


 


Anion Gap  19


 


BUN  33 H


 


Creatinine  4.0 H


 


Est GFR ( Amer)  14


 


Est GFR (Non-Af Amer)  11


 


Random Glucose  77


 


Calcium  7.5 L


 


Phosphorus  3.3


 


Total Bilirubin  0.8


 


AST  50 H D


 


ALT  15


 


Alkaline Phosphatase  662 H


 


Total Protein  7.2


 


Albumin  3.3 L


 


Globulin  3.8


 


Albumin/Globulin Ratio  0.9 L

## 2018-04-15 NOTE — CP.PCM.PN
Subjective





- Date & Time of Evaluation


Date of Evaluation: 04/15/18


Time of Evaluation: 17:30





- Subjective


Subjective: 





Patient seen and evaluated


For NIKKI tomorrow





Orders written





Objective





- Vital Signs/Intake and Output


Vital Signs (last 24 hours): 


 











Temp Pulse Resp BP Pulse Ox


 


 97.3 F L  69   20   138/69   99 


 


 04/15/18 15:00  04/15/18 15:00  04/15/18 15:00  04/15/18 15:00  04/15/18 15:00








Intake and Output: 


 











 04/15/18 04/16/18





 18:59 06:59


 


Intake Total 600 400


 


Output Total 0 


 


Balance 600 400














- Medications


Medications: 


 Current Medications





Ascorbic Acid (Vitamin C 500 Mg Tab)  1,500 mg PO DAILY [facility]


   Last Admin: 04/15/18 09:55 Dose:  1,500 mg


Cinacalcet (Sensipar)  30 mg PO DAILY [facility]


   Last Admin: 04/15/18 09:55 Dose:  30 mg


Epoetin Jeff (Procrit)  10,000 unit IV TTS [facility]


   Last Admin: 04/14/18 14:58 Dose:  10,000 unit


Vancomycin/Sodium Chloride (Vancomycin 1 Gm/Ns 200 Ml)  1 gm in 200 mls @ 166.7 

mls/hr IVPB TTS [facility]


   PRN Reason: Protocol


   Stop: 04/19/18 10:01


   Last Admin: 04/14/18 18:00 Dose:  166.7 mls/hr


Metoprolol Succinate (Toprol Xl)  100 mg PO DAILY [facility]


   Last Admin: 04/15/18 09:55 Dose:  100 mg


Oxycodone/Acetaminophen (Percocet 5/325 Mg Tab)  1 tab PO Q6H [facility]


   PRN Reason: Pain, moderate (4-7)


   Stop: 04/18/18 21:56


   Last Admin: 04/15/18 22:09 Dose:  1 tab


Sertraline HCl (Zoloft)  50 mg PO BID [facility]


   Last Admin: 04/15/18 17:35 Dose:  50 mg











- Labs


Labs: 


 





 04/15/18 07:26 





 04/15/18 07:26 





 











PT  12.9 SECONDS (9.7-12.2)  H  04/12/18  13:32    


 


INR  1.2   04/12/18  13:32    


 


APTT  33 SECONDS (21-34)   04/12/18  13:32

## 2018-04-16 LAB
ALBUMIN SERPL-MCNC: 3.2 [, G/DL] (ref 3.5–5)
ALBUMIN/GLOB SERPL: 0.8 [,] (ref 1–2.1)
ALT SERPL-CCNC: 19 [, U/L] (ref 9–52)
AST SERPL-CCNC: 38 [, U/L] (ref 14–36)
BASOPHILS # BLD AUTO: 0.1 [, K/UL] (ref 0–0.2)
BASOPHILS NFR BLD: 0.7 [, %] (ref 0–2)
BUN SERPL-MCNC: 29 [, MG/DL] (ref 7–17)
CALCIUM SERPL-MCNC: 7.7 [, MG/DL] (ref 8.6–10.4)
EOSINOPHIL # BLD AUTO: 0.7 [, K/UL] (ref 0–0.7)
EOSINOPHIL NFR BLD: 8.6 [, %] (ref 0–4)
ERYTHROCYTE [DISTWIDTH] IN BLOOD BY AUTOMATED COUNT: 16.1 [, %] (ref 11.5–14.5)
GFR NON-AFRICAN AMERICAN: 10 [,]
HGB BLD-MCNC: 9.9 [, G/DL] (ref 11–16)
INR PPP: 1.2 [,]
LYMPHOCYTES # BLD AUTO: 1.6 [, K/UL] (ref 1–4.3)
LYMPHOCYTES NFR BLD AUTO: 18.8 [, %] (ref 20–40)
MCH RBC QN AUTO: 31.7 [, PG] (ref 27–31)
MCHC RBC AUTO-ENTMCNC: 33.5 [, G/DL] (ref 33–37)
MCV RBC AUTO: 94.6 [, FL] (ref 81–99)
MONOCYTES # BLD: 1.6 [, K/UL] (ref 0–0.8)
MONOCYTES NFR BLD: 18.6 [, %] (ref 0–10)
NEUTROPHILS # BLD: 4.5 [, K/UL] (ref 1.8–7)
NEUTROPHILS NFR BLD AUTO: 53.3 [, %] (ref 50–75)
NRBC BLD AUTO-RTO: 0.2 [, %] (ref 0–2)
PLATELET # BLD: 238 [, K/UL] (ref 130–400)
PMV BLD AUTO: 7.7 [, FL] (ref 7.2–11.7)
PROTHROMBIN TIME: 13.9 [, SECONDS] (ref 9.7–12.2)
RBC # BLD AUTO: 3.14 [, MIL/UL] (ref 3.8–5.2)
WBC # BLD AUTO: 8.5 [, K/UL] (ref 4.8–10.8)

## 2018-04-16 PROCEDURE — B24BZZ4 ULTRASONOGRAPHY OF HEART WITH AORTA, TRANSESOPHAGEAL: ICD-10-PCS | Performed by: INTERNAL MEDICINE

## 2018-04-16 RX ADMIN — Medication SCH MG: at 18:07

## 2018-04-16 RX ADMIN — METOPROLOL SUCCINATE SCH: 100 TABLET, EXTENDED RELEASE ORAL at 09:59

## 2018-04-16 RX ADMIN — OXYCODONE HYDROCHLORIDE AND ACETAMINOPHEN PRN TAB: 5; 325 TABLET ORAL at 18:11

## 2018-04-16 RX ADMIN — METOPROLOL SUCCINATE SCH MG: 100 TABLET, EXTENDED RELEASE ORAL at 09:51

## 2018-04-16 NOTE — CP.PCM.PN
Subjective





- Date & Time of Evaluation


Date of Evaluation: 04/16/18


Time of Evaluation: 14:21





- Subjective


Subjective: 





awaiting NIKKI


s/p dialysis 4/14- stable rx


on IV vanco- in-hospital cultures neg


afebrile while in hospital





Objective





- Vital Signs/Intake and Output


Vital Signs (last 24 hours): 


 











Temp Pulse Resp BP Pulse Ox


 


 97.7 F   65   20   145/56 L  99 


 


 04/16/18 07:00  04/16/18 07:00  04/16/18 07:00  04/16/18 07:00  04/16/18 07:00








Intake and Output: 


 











 04/16/18 04/16/18





 06:59 18:59


 


Intake Total 400 


 


Balance 400 














- Medications


Medications: 


 Current Medications





Ascorbic Acid (Vitamin C 500 Mg Tab)  1,500 mg PO DAILY [facility]


   Last Admin: 04/16/18 10:00 Dose:  Not Given


Cinacalcet (Sensipar)  30 mg PO DAILY [facility]


   Last Admin: 04/16/18 09:59 Dose:  Not Given


Epoetin Jeff (Procrit)  10,000 unit IV TTS [facility]


   Last Admin: 04/14/18 14:58 Dose:  10,000 unit


Heparin Sodium (Porcine) (Heparin)  5,000 units SC Q12 [facility]


   Last Admin: 04/16/18 10:11 Dose:  Not Given


Vancomycin/Sodium Chloride (Vancomycin 1 Gm/Ns 200 Ml)  1 gm in 200 mls @ 166.7 

mls/hr IVPB TTS [facility]


   PRN Reason: Protocol


   Stop: 04/19/18 10:01


   Last Admin: 04/14/18 18:00 Dose:  166.7 mls/hr


Metoprolol Succinate (Toprol Xl)  100 mg PO DAILY [facility]


   Last Admin: 04/16/18 09:59 Dose:  Not Given


Oxycodone/Acetaminophen (Percocet 5/325 Mg Tab)  1 tab PO Q6H [facility]


   PRN Reason: Pain, moderate (4-7)


   Stop: 04/18/18 21:56


   Last Admin: 04/15/18 22:09 Dose:  1 tab


Sertraline HCl (Zoloft)  50 mg PO BID [facility]


   Last Admin: 04/16/18 10:00 Dose:  Not Given











- Labs


Labs: 


 





 04/16/18 07:41 





 04/16/18 07:41 





 











PT  13.9 SECONDS (9.7-12.2)  H  04/16/18  07:41    


 


INR  1.2   04/16/18  07:41    


 


APTT  33 SECONDS (21-34)   04/12/18  13:32    














- Head Exam


Head Exam: ATRAUMATIC, NORMAL INSPECTION





- Eye Exam


Eye Exam: EOMI, Normal appearance





- Neck Exam


Neck Exam: Normal Inspection.  absent: Tenderness





- Respiratory Exam


Respiratory Exam: Clear to Ausculation Bilateral, NORMAL BREATHING PATTERN





- Cardiovascular Exam


Cardiovascular Exam: REGULAR RHYTHM, +S1





- GI/Abdominal Exam


GI & Abdominal Exam: Soft.  absent: Tenderness





- Extremities Exam


Extremities Exam: Normal Inspection.  absent: Tenderness





- Neurological Exam


Neurological Exam: Awake, CN II-XII Intact





- Skin


Skin Exam: Dry, Warm





Assessment and Plan


(1) Bacteremia due to Gram-positive bacteria


Status: Acute   





(2) CHF (congestive heart failure)


Status: Acute   





(3) ESRD (end stage renal disease) on dialysis


Status: Acute   





(4) Bacteremia due to Gram-positive bacteria


Status: Acute   





- Assessment and Plan (Free Text)


Plan: 


NIKKI


Dialysis TTS


IV vanco

## 2018-04-16 NOTE — PN
DATE:  04/15/2018



The patient on supportive care, antibiotic.





__________________________________________

Raven Arita MD





DD:  04/15/2018 15:25:16

DT:  04/15/2018 15:56:25

[facility] # [id number]

## 2018-04-16 NOTE — CP.PCM.PN
Subjective





- Date & Time of Evaluation


Date of Evaluation: 04/16/18


Time of Evaluation: 15:40





- Subjective


Subjective: 





Patient seen and evaluated


NIKIK postponed due to technical and scheduling issues





Physical Examination





- Constitutional


Appears: Non-toxic, No Acute Distress





- Head Exam


Head Exam: ATRAUMATIC





- Eye Exam


Eye Exam: EOMI





- ENT Exam


ENT Exam: Mucous Membranes Moist





- Neck Exam


Neck Exam: Full ROM





- Cardiovascular Exam


Cardiovascular Exam: +S1, +S2





- GI/Abdominal Exam


GI & Abdominal Exam: Soft





- Extremities Exam


Extremities Exam: Full ROM


Additional comments: 





left leg bowing noted





- Back Exam


Back Exam: Full ROM





- Neurological Exam


Neurological Exam: Alert, Awake, Oriented x3





- Psychiatric Exam


Psychiatric exam: Normal Affect





- Skin


Skin Exam: Dry, Normal Color





Objective





- Vital Signs/Intake and Output


Vital Signs (last 24 hours): 


 











Temp Pulse Resp BP Pulse Ox


 


 97.7 F   65   20   145/56 L  99 


 


 04/16/18 07:00  04/16/18 07:00  04/16/18 07:00  04/16/18 07:00  04/16/18 07:00








Intake and Output: 


 











 04/16/18 04/17/18





 18:59 06:59


 


Intake Total 0 


 


Balance 0 














- Medications


Medications: 


 Current Medications





Ascorbic Acid (Vitamin C 500 Mg Tab)  1,500 mg PO DAILY [facility]


   Last Admin: 04/16/18 10:00 Dose:  Not Given


Cinacalcet (Sensipar)  30 mg PO DAILY [facility]


   Last Admin: 04/16/18 09:59 Dose:  Not Given


Epoetin Jeff (Procrit)  10,000 unit IV TTS [facility]


   Last Admin: 04/14/18 14:58 Dose:  10,000 unit


Heparin Sodium (Porcine) (Heparin)  5,000 units SC Q12 [facility]


   Last Admin: 04/16/18 21:22 Dose:  5,000 units


Vancomycin/Sodium Chloride (Vancomycin 1 Gm/Ns 200 Ml)  1 gm in 200 mls @ 166.7 

mls/hr IVPB TTS [facility]


   PRN Reason: Protocol


   Stop: 04/19/18 10:01


   Last Admin: 04/14/18 18:00 Dose:  166.7 mls/hr


Metoprolol Succinate (Toprol Xl)  100 mg PO DAILY [facility]


   Last Admin: 04/16/18 09:59 Dose:  Not Given


Oxycodone/Acetaminophen (Percocet 5/325 Mg Tab)  1 tab PO Q6H PRN


   PRN Reason: Pain, moderate (4-7)


   Stop: 04/18/18 21:56


   Last Admin: 04/16/18 18:11 Dose:  1 tab


Saccharomyces Boulardii (Florastor)  250 mg PO BID [facility]


   Last Admin: 04/16/18 18:07 Dose:  250 mg


Sertraline HCl (Zoloft)  50 mg PO BID [facility]


   Last Admin: 04/16/18 18:07 Dose:  50 mg











- Labs


Labs: 


 





 04/16/18 07:41 





 04/16/18 07:41 





 











PT  13.9 SECONDS (9.7-12.2)  H  04/16/18  07:41    


 


INR  1.2   04/16/18  07:41    


 


APTT  33 SECONDS (21-34)   04/12/18  13:32    














Assessment and Plan





- Assessment and Plan (Free Text)


Assessment: 





Bacteremia


Blood Cultures from 4/12 negative to date


F/U Blood cultures 


For NIKKI Echo


On Vancomycin during dialysis


On Florastor





ESRD (end stage renal disease) on dialysis


on T, Th, Sat


On Sensipar and Epo


Nephrology on the case. F/U recommendations


Need to assess whether dialysis catheter is involved





HTN


Metoprolol daily


Continue to monitor





Leg Pain


Physical Therapy recommendations for LAVON


Continue PT while in house





Prophylactic Measure 


Hep SC Q12


SCDs


GI PPx not currently indicated

## 2018-04-16 NOTE — HP
HISTORY OF PRESENT ILLNESS:  A 67-year-old female with history of chronic

hematuria, on hemodialysis, admitted to the hospital with current positive

blood culture.  The patient has history of pneumonia in the past, receives

antibiotics.  The patient discharged from day care, to come back with

positive blood culture.



PHYSICAL EXAMINATION:

GENERAL:  The patient is awake, alert, and oriented.

VITAL SIGNS:  Temperature 98, pulse 90.

HEENT:  Within normal limits.

NECK:  Supple.

CHEST:  Symmetrical.

HEART:  Regular.

ABDOMEN:  Soft.

EXTREMITIES:  No edema.



IMPRESSION:  The patient suffers from renal failure and sepsis.  The

patient is in bedrest, antibiotics, IV fluids.





__________________________________________

Raven Arita MD



DD:  04/13/2018 15:35:28

DT:  04/13/2018 22:11:17

Job # [id number]

## 2018-04-16 NOTE — CP.PCM.PN
Subjective





- Date & Time of Evaluation


Date of Evaluation: 04/16/18


Time of Evaluation: 09:11





- Subjective


Subjective: 





PGY 2 Med Note- Dr. Arita's service





Patient seen and examined . Vitals being obtained at the time by nursing 

student. Patient stable. Patient with complaints of chronic leg pain. Patient 

had a cream applied to legs. Patient tired at the moment.Patient for NIKKI today.





Objective





- Vital Signs/Intake and Output


Vital Signs (last 24 hours): 


 











Temp Pulse Resp BP Pulse Ox


 


 98.7 F   68   20   145/68   98 


 


 04/16/18 00:00  04/16/18 00:00  04/16/18 00:00  04/16/18 00:00  04/16/18 00:00








Intake and Output: 


 











 04/16/18 04/16/18





 06:59 18:59


 


Intake Total 400 


 


Balance 400 














- Medications


Medications: 


 Current Medications





Ascorbic Acid (Vitamin C 500 Mg Tab)  1,500 mg PO DAILY [facility]


   Last Admin: 04/15/18 09:55 Dose:  1,500 mg


Cinacalcet (Sensipar)  30 mg PO DAILY [facility]


   Last Admin: 04/15/18 09:55 Dose:  30 mg


Epoetin Jeff (Procrit)  10,000 unit IV TTS [facility]


   Last Admin: 04/14/18 14:58 Dose:  10,000 unit


Vancomycin/Sodium Chloride (Vancomycin 1 Gm/Ns 200 Ml)  1 gm in 200 mls @ 166.7 

mls/hr IVPB TTS [facility]


   PRN Reason: Protocol


   Stop: 04/19/18 10:01


   Last Admin: 04/14/18 18:00 Dose:  166.7 mls/hr


Metoprolol Succinate (Toprol Xl)  100 mg PO DAILY [facility]


   Last Admin: 04/15/18 09:55 Dose:  100 mg


Oxycodone/Acetaminophen (Percocet 5/325 Mg Tab)  1 tab PO Q6H [facility]


   PRN Reason: Pain, moderate (4-7)


   Stop: 04/18/18 21:56


   Last Admin: 04/15/18 22:09 Dose:  1 tab


Sertraline HCl (Zoloft)  50 mg PO BID [facility]


   Last Admin: 04/15/18 17:35 Dose:  50 mg











- Labs


Labs: 


 





 04/16/18 07:41 





 04/16/18 07:41 





 











PT  13.9 SECONDS (9.7-12.2)  H  04/16/18  07:41    


 


INR  1.2   04/16/18  07:41    


 


APTT  33 SECONDS (21-34)   04/12/18  13:32    














- Constitutional


Appears: Non-toxic, No Acute Distress





- Head Exam


Head Exam: ATRAUMATIC





- Eye Exam


Eye Exam: EOMI





- ENT Exam


ENT Exam: Mucous Membranes Moist





- Neck Exam


Neck Exam: Full ROM





- Cardiovascular Exam


Cardiovascular Exam: +S1, +S2





- GI/Abdominal Exam


GI & Abdominal Exam: Soft





- Extremities Exam


Extremities Exam: Full ROM


Additional comments: 





left leg bowing noted





- Back Exam


Back Exam: Full ROM





- Neurological Exam


Neurological Exam: Alert, Awake, Oriented x3





- Psychiatric Exam


Psychiatric exam: Normal Affect





- Skin


Skin Exam: Dry, Normal Color





Assessment and Plan





- Assessment and Plan (Free Text)


Assessment: 





Bacteremia


Blood Cultures from 4/12 negative to date


F/U Blood cultures 


F/U NIKKI Echo


Dr. Randall, Cardiologist  on the case- F/U recommendations


On Vancomycin during dialysis


On Florastor





ESRD (end stage renal disease) on dialysis


on T, Th, Sat


On Sensipar and Epo


Nephrology on the case. F/U recommendations


Need to assess whether dialysis catheter is involved





HTN


Metoprolol daily


Continue to monitor





Leg Pain


Physical Therapy recommendations for LAVON


Continue PT while in house





Prophylactic Measure 


Hep SC Q12


SCDs


GI PPx not currently indicated





Discussed with attending. All planning and management per Dr. Arita.

## 2018-04-17 LAB
ALBUMIN SERPL-MCNC: 3.2 [, G/DL] (ref 3.5–5)
ALBUMIN/GLOB SERPL: 0.8 [,] (ref 1–2.1)
ALT SERPL-CCNC: 20 [, U/L] (ref 9–52)
AST SERPL-CCNC: 37 [, U/L] (ref 14–36)
BASOPHILS # BLD AUTO: 0.1 [, K/UL] (ref 0–0.2)
BASOPHILS NFR BLD: 1 [, %] (ref 0–2)
BUN SERPL-MCNC: 39 [, MG/DL] (ref 7–17)
CALCIUM SERPL-MCNC: 8 [, MG/DL] (ref 8.6–10.4)
EOSINOPHIL # BLD AUTO: 0.7 [, K/UL] (ref 0–0.7)
EOSINOPHIL NFR BLD: 7.3 [, %] (ref 0–4)
ERYTHROCYTE [DISTWIDTH] IN BLOOD BY AUTOMATED COUNT: 16.3 [, %] (ref 11.5–14.5)
GFR NON-AFRICAN AMERICAN: 8 [,]
HGB BLD-MCNC: 10 [, G/DL] (ref 11–16)
LYMPHOCYTES # BLD AUTO: 1.7 [, K/UL] (ref 1–4.3)
LYMPHOCYTES NFR BLD AUTO: 18.4 [, %] (ref 20–40)
MCH RBC QN AUTO: 31.2 [, PG] (ref 27–31)
MCHC RBC AUTO-ENTMCNC: 32.9 [, G/DL] (ref 33–37)
MCV RBC AUTO: 94.7 [, FL] (ref 81–99)
MONOCYTES # BLD: 1.5 [, K/UL] (ref 0–0.8)
MONOCYTES NFR BLD: 16.4 [, %] (ref 0–10)
NEUTROPHILS # BLD: 5.1 [, K/UL] (ref 1.8–7)
NEUTROPHILS NFR BLD AUTO: 56.9 [, %] (ref 50–75)
NRBC BLD AUTO-RTO: 0.1 [, %] (ref 0–2)
PLATELET # BLD: 243 [, K/UL] (ref 130–400)
PMV BLD AUTO: 7.6 [, FL] (ref 7.2–11.7)
RBC # BLD AUTO: 3.2 [, MIL/UL] (ref 3.8–5.2)
WBC # BLD AUTO: 9 [, K/UL] (ref 4.8–10.8)

## 2018-04-17 RX ADMIN — ERYTHROPOIETIN SCH UNIT: 10000 INJECTION, SOLUTION INTRAVENOUS; SUBCUTANEOUS at 10:40

## 2018-04-17 RX ADMIN — Medication SCH MG: at 18:08

## 2018-04-17 RX ADMIN — VANCOMYCIN HYDROCHLORIDE SCH MLS/HR: 1 INJECTION, POWDER, LYOPHILIZED, FOR SOLUTION INTRAVENOUS at 15:00

## 2018-04-17 RX ADMIN — METOPROLOL SUCCINATE SCH MG: 100 TABLET, EXTENDED RELEASE ORAL at 15:00

## 2018-04-17 RX ADMIN — Medication SCH MG: at 08:41

## 2018-04-17 RX ADMIN — OXYCODONE HYDROCHLORIDE AND ACETAMINOPHEN PRN TAB: 5; 325 TABLET ORAL at 15:09

## 2018-04-17 RX ADMIN — Medication SCH: at 12:22

## 2018-04-17 RX ADMIN — METOPROLOL SUCCINATE SCH: 100 TABLET, EXTENDED RELEASE ORAL at 12:24

## 2018-04-17 RX ADMIN — OXYCODONE HYDROCHLORIDE AND ACETAMINOPHEN PRN TAB: 5; 325 TABLET ORAL at 21:26

## 2018-04-17 RX ADMIN — OXYCODONE HYDROCHLORIDE AND ACETAMINOPHEN PRN TAB: 5; 325 TABLET ORAL at 08:41

## 2018-04-17 RX ADMIN — VANCOMYCIN HYDROCHLORIDE SCH: 1 INJECTION, POWDER, LYOPHILIZED, FOR SOLUTION INTRAVENOUS at 12:25

## 2018-04-17 NOTE — CP.PCM.PN
Subjective





- Date & Time of Evaluation


Date of Evaluation: 04/17/18


Time of Evaluation: 12:54





- Subjective


Subjective: 





seen and examined


NIKKI postponed


hd yesterday


afebrile, bp stable





seen in hd tolerated well.


avf used w/o problems





Objective





- Vital Signs/Intake and Output


Vital Signs (last 24 hours): 


 











Temp Pulse Resp BP Pulse Ox


 


 97.8 F   80   19   133/65   100 


 


 04/17/18 10:00  04/17/18 10:00  04/17/18 10:00  04/17/18 11:30  04/17/18 10:00











- Medications


Medications: 


 Current Medications





Ascorbic Acid (Vitamin C 500 Mg Tab)  1,500 mg PO DAILY [facility]


   Last Admin: 04/17/18 12:25 Dose:  Not Given


Cinacalcet (Sensipar)  30 mg PO DAILY [facility]


   Last Admin: 04/17/18 12:24 Dose:  Not Given


Epoetin Jeff (Procrit)  10,000 unit IV TTS [facility]


   Last Admin: 04/17/18 10:40 Dose:  10,000 unit


Heparin Sodium (Porcine) (Heparin)  5,000 units SC Q12 [facility]


   Last Admin: 04/17/18 12:23 Dose:  Not Given


Vancomycin/Sodium Chloride (Vancomycin 1 Gm/Ns 200 Ml)  1 gm in 200 mls @ 166.7 

mls/hr IVPB TTS [facility]


   PRN Reason: Protocol


   Stop: 04/19/18 10:01


   Last Admin: 04/17/18 12:25 Dose:  Not Given


Metoprolol Succinate (Toprol Xl)  100 mg PO DAILY [facility]


   Last Admin: 04/17/18 12:24 Dose:  Not Given


Oxycodone/Acetaminophen (Percocet 5/325 Mg Tab)  1 tab PO Q6H [facility]


   PRN Reason: Pain, moderate (4-7)


   Stop: 04/18/18 21:56


   Last Admin: 04/17/18 08:41 Dose:  1 tab


Saccharomyces Boulardii (Florastor)  250 mg PO BID [facility]


   Last Admin: 04/17/18 12:22 Dose:  Not Given


Sertraline HCl (Zoloft)  50 mg PO BID [facility]


   Last Admin: 04/17/18 12:25 Dose:  Not Given











- Labs


Labs: 


 





 04/17/18 06:38 





 04/17/18 06:38 





 











PT  13.9 SECONDS (9.7-12.2)  H  04/16/18  07:41    


 


INR  1.2   04/16/18  07:41    


 


APTT  33 SECONDS (21-34)   04/12/18  13:32    














- Constitutional


Appears: No Acute Distress, Chronically Ill





- Head Exam


Head Exam: NORMAL INSPECTION, NORMOCEPHALIC





- Eye Exam


Eye Exam: Normal appearance, PERRL





- ENT Exam


ENT Exam: Mucous Membranes Moist, Normal Exam





- Neck Exam


Neck Exam: Full ROM, Normal Inspection





- Respiratory Exam


Respiratory Exam: Clear to Ausculation Bilateral, NORMAL BREATHING PATTERN





- Cardiovascular Exam


Cardiovascular Exam: REGULAR RHYTHM, RRR





- GI/Abdominal Exam


GI & Abdominal Exam: Distended, Soft, Normal Bowel Sounds





- Extremities Exam


Extremities Exam: Normal Inspection (lue avf w/ thrill. rt chest permcath)





Assessment and Plan


(1) Bacteremia due to Gram-positive bacteria


Status: Acute   





(2) CHF (congestive heart failure)


Status: Acute   





(3) ESRD (end stage renal disease) on dialysis


Status: Acute   





(4) Positive blood culture


Status: Acute   





(5) HTN (hypertension)


Status: Acute   





- Assessment and Plan (Free Text)


Assessment: 





maintain iv vancomycin


hd mwf


await nikki to decide duration of antibiotics


recommend dc permcath

## 2018-04-17 NOTE — CP.PCM.PN
Subjective





- Date & Time of Evaluation


Date of Evaluation: 04/17/18


Time of Evaluation: 17:05





- Subjective


Subjective: 





Patient seen and evaluated


Denies chest pain and dyspnea





For NIKKI tomorrow





Objective





- Vital Signs/Intake and Output


Vital Signs (last 24 hours): 


 











Temp Pulse Resp BP Pulse Ox


 


 98.3 F   87   20   157/70 H  97 


 


 04/17/18 15:00  04/17/18 15:00  04/17/18 15:00  04/17/18 15:00  04/17/18 15:00








Intake and Output: 


 











 04/17/18 04/18/18





 18:59 06:59


 


Intake Total  500


 


Balance  500














- Medications


Medications: 


 Current Medications





Ascorbic Acid (Vitamin C 500 Mg Tab)  1,500 mg PO DAILY [facility]


   Last Admin: 04/17/18 12:25 Dose:  Not Given


Cinacalcet (Sensipar)  30 mg PO DAILY [facility]


   Last Admin: 04/17/18 12:24 Dose:  Not Given


Epoetin Jeff (Procrit)  10,000 unit IV TTS [facility]


   Last Admin: 04/17/18 10:40 Dose:  10,000 unit


Heparin Sodium (Porcine) (Heparin)  5,000 units SC Q12 [facility]


   Last Admin: 04/17/18 21:23 Dose:  Not Given


Vancomycin/Sodium Chloride (Vancomycin 1 Gm/Ns 200 Ml)  1 gm in 200 mls @ 166.7 

mls/hr IVPB TTS [facility]


   PRN Reason: Protocol


   Stop: 04/19/18 10:01


   Last Admin: 04/17/18 15:00 Dose:  166.7 mls/hr


Metoprolol Succinate (Toprol Xl)  100 mg PO DAILY [facility]


   Last Admin: 04/17/18 15:00 Dose:  100 mg


Oxycodone/Acetaminophen (Percocet 5/325 Mg Tab)  1 tab PO Q6H [facility]


   PRN Reason: Pain, moderate (4-7)


   Stop: 04/18/18 21:56


   Last Admin: 04/17/18 21:26 Dose:  1 tab


Saccharomyces Boulardii (Florastor)  250 mg PO BID [facility]


   Last Admin: 04/17/18 18:08 Dose:  250 mg


Sertraline HCl (Zoloft)  50 mg PO BID [facility]


   Last Admin: 04/17/18 18:08 Dose:  50 mg











- Labs


Labs: 


 





 04/17/18 06:38 





 04/17/18 06:38 





 











PT  13.9 SECONDS (9.7-12.2)  H  04/16/18  07:41    


 


INR  1.2   04/16/18  07:41    


 


APTT  33 SECONDS (21-34)   04/12/18  13:32

## 2018-04-17 NOTE — CP.PCM.CON
History of Present Illness





- History of Present Illness


History of Present Illness: 





Vascular surgery consult note for Dr. Mock


Consulted for: Possible catheter associated bacteremia





Patient is a 67F with ESRD on HD. Patient had a hybrid AVshunt placed in her 

left arm and a HD permacath in her RIJ 2 months ago. As an outpatient patient 

had chills and a possible blood culture. Patient was admitted and started on IV 

antibiotics--blood cultures have no growth for 5 days at this point. HD was 

successfully completed through the left AV shunt today and Vascular surgery was 

consulted for removal of the permacath





Review of Systems





- Review of Systems


All systems: reviewed and no additional remarkable complaints except (as per HPI

)


Review of Systems: 





pain in her right arm and her left ankle





Past Patient History





- Infectious Disease


Hx of Infectious Diseases: None





- Past Medical History & Family History


Past Medical History?: Yes





- Past Social History


Smoking Status: Former Smoker


Chewing Tobacco Use: No


Cigar Use: No


Alcohol: None


Drugs: Denies


Home Situation {Lives}: Nursing Home





- CARDIAC


Hx Congestive Heart Failure: Yes


Hx Hypertension: Yes





- PULMONARY


Hx Respiratory Disorders: No





- NEUROLOGICAL


Hx Transient Ischemic Attacks (TIA): Yes





- HEENT


Hx HEENT Problems: No





- RENAL


Hx Chronic Kidney Disease: Yes


Type of Dialysis Access: R chest perma cath


Date of Last Dialysis Treatment: 04/12/18





- ENDOCRINE/METABOLIC


Hx Endocrine Disorders: No





- HEMATOLOGICAL/ONCOLOGICAL


Hx Blood Disorders: Yes





- INTEGUMENTARY


Hx Dermatological Problems: No





- MUSCULOSKELETAL/RHEUMATOLOGICAL


Hx Arthritis: Yes (OA)





- GASTROINTESTINAL


Hx Diverticulitis: Yes


Hx Pancreatitis: Yes





- GENITOURINARY/GYNECOLOGICAL


Hx Genitourinary Disorders: No





- PSYCHIATRIC


Hx Anxiety: Yes


Hx Substance Use: No





- SURGICAL HISTORY


Hx Cholecystectomy: Yes





- ANESTHESIA


Hx Anesthesia: Yes


Hx Anesthesia Reactions: No


Hx Malignant Hyperthermia: No





Meds


Allergies/Adverse Reactions: 


 Allergies











Allergy/AdvReac Type Severity Reaction Status Date / Time


 


aspirin Allergy  NAUSEA Verified 04/12/18 12:54


 


lactose Allergy  NAUSEA Verified 04/12/18 12:54














- Medications


Medications: 


 Current Medications





Ascorbic Acid (Vitamin C 500 Mg Tab)  1,500 mg PO DAILY [facility]


   Last Admin: 04/17/18 12:25 Dose:  Not Given


Cinacalcet (Sensipar)  30 mg PO DAILY [facility]


   Last Admin: 04/17/18 12:24 Dose:  Not Given


Epoetin Jeff (Procrit)  10,000 unit IV TTS [facility]


   Last Admin: 04/17/18 10:40 Dose:  10,000 unit


Heparin Sodium (Porcine) (Heparin)  5,000 units SC Q12 [facility]


   Last Admin: 04/17/18 12:23 Dose:  Not Given


Vancomycin/Sodium Chloride (Vancomycin 1 Gm/Ns 200 Ml)  1 gm in 200 mls @ 166.7 

mls/hr IVPB TTS [facility]


   PRN Reason: Protocol


   Stop: 04/19/18 10:01


   Last Admin: 04/17/18 15:00 Dose:  166.7 mls/hr


Metoprolol Succinate (Toprol Xl)  100 mg PO DAILY [facility]


   Last Admin: 04/17/18 15:00 Dose:  100 mg


Oxycodone/Acetaminophen (Percocet 5/325 Mg Tab)  1 tab PO Q6H [facility]


   PRN Reason: Pain, moderate (4-7)


   Stop: 04/18/18 21:56


   Last Admin: 04/17/18 15:09 Dose:  1 tab


Saccharomyces Boulardii (Florastor)  250 mg PO BID [facility]


   Last Admin: 04/17/18 18:08 Dose:  250 mg


Sertraline HCl (Zoloft)  50 mg PO BID [facility]


   Last Admin: 04/17/18 18:08 Dose:  50 mg











Physical Exam





- Constitutional


Appears: Well, Non-toxic, No Acute Distress





- Head Exam


Head Exam: ATRAUMATIC, NORMOCEPHALIC





- Eye Exam


Eye Exam: Normal appearance.  absent: Conjunctival injection, Scleral icterus





- ENT Exam


ENT Exam: Mucous Membranes Moist, Normal Oropharynx





- Respiratory Exam


Respiratory Exam: NORMAL BREATHING PATTERN.  absent: Accessory Muscle Use, 

Respiratory Distress





- GI/Abdominal Exam


GI & Abdominal Exam: absent: Distended





- Extremities Exam


Extremities exam: Negative for: calf tenderness, pedal edema


Additional comments: 





left AV shunt with a dressing over it. BL hands warm and normal color.





- Neurological Exam


Neurological exam: Alert, Oriented x3





- Psychiatric Exam


Psychiatric exam: Normal Affect, Normal Mood





- Skin


Skin Exam: Dry, Normal Color, Warm





- Additional Findings


Additional findings: 





permacath in the right anterior chest with no surrounding erythema, swelling, 

or purulent drainage or bleeding.





Results





- Vital Signs


Recent Vital Signs: 


 Last Vital Signs











Temp  98.3 F   04/17/18 15:00


 


Pulse  87   04/17/18 15:00


 


Resp  20   04/17/18 15:00


 


BP  157/70 H  04/17/18 15:00


 


Pulse Ox  97   04/17/18 15:00














- Labs


Result Diagrams: 


 04/17/18 06:38





 04/17/18 06:38


Labs: 


 Laboratory Results - last 24 hr











  04/17/18 04/17/18





  06:38 06:38


 


WBC  9.0 


 


RBC  3.20 L 


 


Hgb  10.0 L 


 


Hct  30.3 L 


 


MCV  94.7 


 


MCH  31.2 H 


 


MCHC  32.9 L 


 


RDW  16.3 H 


 


Plt Count  243 


 


MPV  7.6 


 


Neut % (Auto)  56.9 


 


Lymph % (Auto)  18.4 L 


 


Mono % (Auto)  16.4 H 


 


Eos % (Auto)  7.3 H 


 


Baso % (Auto)  1.0 


 


Neut # (Auto)  5.1 


 


Lymph # (Auto)  1.7 


 


Mono # (Auto)  1.5 H 


 


Eos # (Auto)  0.7 


 


Baso # (Auto)  0.1 


 


Sodium   133


 


Potassium   4.4


 


Chloride   93 L


 


Carbon Dioxide   27


 


Anion Gap   18


 


BUN   39 H


 


Creatinine   5.5 H


 


Est GFR ( Amer)   9


 


Est GFR (Non-Af Amer)   8


 


Random Glucose   86


 


Calcium   8.0 L


 


Phosphorus   3.5


 


Magnesium   2.2


 


Total Bilirubin   0.8


 


AST   37 H


 


ALT   20


 


Alkaline Phosphatase   607 H


 


Total Protein   7.3


 


Albumin   3.2 L


 


Globulin   4.1 H


 


Albumin/Globulin Ratio   0.8 L














Assessment & Plan





- Assessment and Plan (Free Text)


Assessment: 





67F with ESRD with possible HD catheter associated bacteremia


Plan: 





-HD cath in the right IJ removed at bedside with Dr. Mock present with no 

negative sequela, patient tolerated well, no sign of hematoma or active bleed, 

pressure dressing applied.


-Use HD shunt for further HD


-Medical management per primary, antibiotics per ID


-Thank you for this consult. Please reach out to the surgical team for any 

concerns





Seen and discussed with Dr. Nish Christina, PGY2

## 2018-04-17 NOTE — CP.PCM.PN
Subjective





- Date & Time of Evaluation


Date of Evaluation: 04/17/18


Time of Evaluation: 09:02





- Subjective


Subjective: 





PGY 2 Med Note- Dr. Arita's service





Patient seen and examined in no apparent acute distress. Patient states that 

her leg and arm had been hurting. A family friend brought in a leg brace which 

has helped her symptoms. Patient states that she was following up with an 

orthopedic doctor outpatient. Patient denies subjective fevers or chills, nausea

, vomiting, diarrhea or constipation at this time.





Objective





- Vital Signs/Intake and Output


Vital Signs (last 24 hours): 


 











Temp Pulse Resp BP Pulse Ox


 


 97.9 F   75   20   131/70   100 


 


 04/17/18 07:00  04/17/18 07:00  04/17/18 07:00  04/17/18 07:00  04/17/18 07:00











- Medications


Medications: 


 Current Medications





Ascorbic Acid (Vitamin C 500 Mg Tab)  1,500 mg PO DAILY [facility]


   Last Admin: 04/17/18 08:49 Dose:  1,500 mg


Cinacalcet (Sensipar)  30 mg PO DAILY [facility]


   Last Admin: 04/17/18 08:51 Dose:  30 mg


Epoetin Jeff (Procrit)  10,000 unit IV TTS [facility]


   Last Admin: 04/14/18 14:58 Dose:  10,000 unit


Heparin Sodium (Porcine) (Heparin)  5,000 units SC Q12 [facility]


   Last Admin: 04/17/18 08:41 Dose:  5,000 units


Vancomycin/Sodium Chloride (Vancomycin 1 Gm/Ns 200 Ml)  1 gm in 200 mls @ 166.7 

mls/hr IVPB TTS [facility]


   PRN Reason: Protocol


   Stop: 04/19/18 10:01


   Last Admin: 04/14/18 18:00 Dose:  166.7 mls/hr


Metoprolol Succinate (Toprol Xl)  100 mg PO DAILY [facility]


   Last Admin: 04/16/18 09:59 Dose:  Not Given


Oxycodone/Acetaminophen (Percocet 5/325 Mg Tab)  1 tab PO Q6H [facility]


   PRN Reason: Pain, moderate (4-7)


   Stop: 04/18/18 21:56


   Last Admin: 04/17/18 08:41 Dose:  1 tab


Saccharomyces Boulardii (Florastor)  250 mg PO BID [facility]


   Last Admin: 04/17/18 08:41 Dose:  250 mg


Sertraline HCl (Zoloft)  50 mg PO BID [facility]


   Last Admin: 04/17/18 08:41 Dose:  50 mg











- Labs


Labs: 


 





 04/17/18 06:38 





 04/17/18 06:38 





 











PT  13.9 SECONDS (9.7-12.2)  H  04/16/18  07:41    


 


INR  1.2   04/16/18  07:41    


 


APTT  33 SECONDS (21-34)   04/12/18  13:32    














- Constitutional


Appears: Non-toxic, No Acute Distress





- Head Exam


Head Exam: ATRAUMATIC, NORMAL INSPECTION





- Eye Exam


Eye Exam: EOMI, Normal appearance





- ENT Exam


ENT Exam: Mucous Membranes Moist





- Respiratory Exam


Respiratory Exam: NORMAL BREATHING PATTERN





- Cardiovascular Exam


Cardiovascular Exam: +S1





- GI/Abdominal Exam


GI & Abdominal Exam: Soft, Normal Bowel Sounds





- Extremities Exam


Extremities Exam: Normal Capillary Refill, Tenderness


Additional comments: 





bowing of left leg, soft cusion brace noted





- Back Exam


Back Exam: NORMAL INSPECTION





- Neurological Exam


Neurological Exam: Alert, Awake, Oriented x3





- Psychiatric Exam


Psychiatric exam: Normal Affect, Normal Mood





- Skin


Skin Exam: Dry, Warm





Assessment and Plan





- Assessment and Plan (Free Text)


Assessment: 





Bacteremia


Blood Cultures from 4/12 negative to date after four days


F/U Blood cultures 


F/U NIKKI Echo. Had to be rescheduled. Will follow up on date.


Dr. Randall, Cardiologist  on the case- F/U recommendations


On Vancomycin during dialysis


On Florastor





ESRD (end stage renal disease) on dialysis


on T, Th, Sat


On Sensipar and Epo


Nephrology on the case. F/U recommendations


Need to assess whether dialysis catheter is involved





HTN


Metoprolol daily


Continue to monitor





Leg Pain


Physical Therapy 


Continue PT while in house. Patient does not  desire [facility].





Prophylactic Measure 


Hep SC Q12


SCDs


GI PPx not currently indicated





Discussed with attending. All planning and management per Dr. Arita.

## 2018-04-17 NOTE — CARD
--------------- APPROVED REPORT --------------





EXAM: Two-dimensional and M-mode echocardiogram with Doppler and 

color Doppler.



Other Information 

Quality : [employer] : 



INDICATION

Infection:Rule out subacute bacterial endocarditis ESRD,DIALYSIS



RISK FACTORS

Hypertension 



2D DIMENSIONS 

IVSd1.0   (0.7-1.1cm)LVDd4.5   (3.9-5.9cm)

PWd0.8   (0.7-1.1cm)LVDs3.7   (2.5-4.0cm)

FS (%) 17.4   %



M-Mode DIMENSIONS 

RVDd3.67   (2.1-3.2cm)Left Atrium (MM)4.41   (2.5-4.0cm)

IVSd0.94   (0.7-1.1cm)Aortic Root2.69   (2.2-3.7cm)

LVDd4.57   (4.0-5.6cm)Aortic Cusp Exc.1.69   (1.5-2.0cm)

PWd0.94   (0.7-1.1cm)FS (%) 32   %

LVDs3.12   (2.0-3.8cm)LVEF (%)45   (>50%)



Mitral Valve

MV E Cfklvbux025.6cm/sMV A Aozjfywp89.7cm/sE/A ratio2.0



TDI

E/Lateral E'0.0E/Medial E'0.0



Tricuspid Valve

TR Peak Emidcsme340gv/sTR Peak Gr.[id number]



 LEFT VENTRICLE 

The left ventricle is normal size.

There is borderline concentric left ventricular hypertrophy.

Left ventricle systolic function is mildly impaired.

The Ejection Fraction is 45-50%.

There is a flattened septum consistent with right ventricle volume 

overload.

Transmitral Doppler flow pattern is Grade II-pseudonormal filling 

dynamics.

There is no ventricular septal defect visualized.



 RIGHT VENTRICLE 

The right ventricle is mildly dilated.

The right ventricle is mildly hypertrophied. 

The right ventricular systolic function is normal.



 ATRIA 

The left atrium is moderately dilated. 

The right atrium is moderately dilated. 



 AORTIC VALVE 

The aortic valve is moderately sclerotic.

The aortic valve is tri-cuspid.

No aortic regurgitation is present.

There is no aortic valvular stenosis. 

Cannot exclude aortic valvular vegetation. NIKKI is more specific to 

R/O vegetations.



 MITRAL VALVE 

The mitral valve is normal in structure.

There is no evidence of mitral valve prolapse.

Mitral regurgitation is trace.



 TRICUSPID VALVE 

The tricuspid valve is normal in structure.

There is moderate to severe tricuspid regurgitation.

Right ventricular systolic pressure is estimated at 50-60 mmHg. 

There is moderate-severe pulmonary hypertension.



 PULMONIC VALVE 

The pulmonic valve is not well visualized.

There is trace pulmonic valvular regurgitation. 



 GREAT VESSELS 

The aortic root is normal in size.

The ascending aorta is normal in size.

The IVC is normal in size and collapses >50% with inspiration.



 PERICARDIAL EFFUSION 

There is no pericardial effusion.



<Conclusion>

There is borderline concentric left ventricular hypertrophy.

Left ventricle systolic function is mildly impaired.

The Ejection Fraction is 45-50%.

Transmitral Doppler flow pattern is Grade II-pseudonormal filling 

dynamics.

Cannot exclude aortic valvular vegetation. NIKKI is more specific to 

R/O vegetations.

There is moderate-severe pulmonary hypertension.

Mitral regurgitation is trace.

## 2018-04-17 NOTE — CP.PCM.PN
Subjective





- Date & Time of Evaluation


Date of Evaluation: 04/17/18


Time of Evaluation: 08:00





- Subjective


Subjective: 





afeb  nad





Objective





- Vital Signs/Intake and Output


Vital Signs (last 24 hours): 


 











Temp Pulse Resp BP Pulse Ox


 


 97.8 F   80   19   136/75   100 


 


 04/17/18 10:00  04/17/18 10:00  04/17/18 10:00  04/17/18 10:30  04/17/18 10:00











- Medications


Medications: 


 Current Medications





Ascorbic Acid (Vitamin C 500 Mg Tab)  1,500 mg PO DAILY [facility]


   Last Admin: 04/17/18 08:49 Dose:  1,500 mg


Cinacalcet (Sensipar)  30 mg PO DAILY [facility]


   Last Admin: 04/17/18 08:51 Dose:  30 mg


Epoetin Jeff (Procrit)  10,000 unit IV TTS [facility]


   Last Admin: 04/17/18 10:40 Dose:  10,000 unit


Heparin Sodium (Porcine) (Heparin)  5,000 units SC Q12 [facility]


   Last Admin: 04/17/18 08:41 Dose:  5,000 units


Vancomycin/Sodium Chloride (Vancomycin 1 Gm/Ns 200 Ml)  1 gm in 200 mls @ 166.7 

mls/hr IVPB TTS [facility]


   PRN Reason: Protocol


   Stop: 04/19/18 10:01


   Last Admin: 04/14/18 18:00 Dose:  166.7 mls/hr


Metoprolol Succinate (Toprol Xl)  100 mg PO DAILY [facility]


   Last Admin: 04/16/18 09:59 Dose:  Not Given


Oxycodone/Acetaminophen (Percocet 5/325 Mg Tab)  1 tab PO Q6H PRN


   PRN Reason: Pain, moderate (4-7)


   Stop: 04/18/18 21:56


   Last Admin: 04/17/18 08:41 Dose:  1 tab


Saccharomyces Boulardii (Florastor)  250 mg PO BID [facility]


   Last Admin: 04/17/18 08:41 Dose:  250 mg


Sertraline HCl (Zoloft)  50 mg PO BID [facility]


   Last Admin: 04/17/18 08:41 Dose:  50 mg











- Labs


Labs: 


 





 04/17/18 06:38 





 04/17/18 06:38 





 











PT  13.9 SECONDS (9.7-12.2)  H  04/16/18  07:41    


 


INR  1.2   04/16/18  07:41    


 


APTT  33 SECONDS (21-34)   04/12/18  13:32    














- Constitutional


Appears: Non-toxic, Chronically Ill





- Head Exam


Head Exam: NORMOCEPHALIC





- Eye Exam


Eye Exam: PERRL





- ENT Exam


ENT Exam: Mucous Membranes Dry





- Neck Exam


Neck Exam: absent: Thyromegaly





- Respiratory Exam


Respiratory Exam: Decreased Breath Sounds





- Cardiovascular Exam


Cardiovascular Exam: REGULAR RHYTHM





- GI/Abdominal Exam


GI & Abdominal Exam: Distended, Soft





- Rectal Exam


Rectal Exam: Deferred





-  Exam


 Exam: NORMAL INSPECTION





Assessment and Plan


(1) Bacteremia due to Gram-positive bacteria


Status: Acute   





(2) CHF (congestive heart failure)


Status: Acute   





(3) ESRD (end stage renal disease) on dialysis


Status: Acute   





(4) Positive blood culture


Status: Acute   





(5) Appendicitis


Status: Acute   





(6) Bacteremia due to Gram-positive bacteria


Status: Acute   





(7) CHF (congestive heart failure)


Status: Acute   





(8) End stage renal disease


Status: Acute   





- Assessment and Plan (Free Text)


Assessment: 





TEEpostponed


cont iv rx

## 2018-04-18 LAB
ALBUMIN SERPL-MCNC: 3.3 [, G/DL] (ref 3.5–5)
ALBUMIN/GLOB SERPL: 0.8 [,] (ref 1–2.1)
ALT SERPL-CCNC: 11 [, U/L] (ref 9–52)
AST SERPL-CCNC: 38 [, U/L] (ref 14–36)
BASOPHILS # BLD AUTO: 0.1 [, K/UL] (ref 0–0.2)
BASOPHILS NFR BLD: 0.8 [, %] (ref 0–2)
BUN SERPL-MCNC: 22 [, MG/DL] (ref 7–17)
CALCIUM SERPL-MCNC: 8.3 [, MG/DL] (ref 8.6–10.4)
EOSINOPHIL # BLD AUTO: 0.7 [, K/UL] (ref 0–0.7)
EOSINOPHIL NFR BLD: 7.7 [, %] (ref 0–4)
ERYTHROCYTE [DISTWIDTH] IN BLOOD BY AUTOMATED COUNT: 16.2 [, %] (ref 11.5–14.5)
GFR NON-AFRICAN AMERICAN: 11 [,]
HGB BLD-MCNC: 10.1 [, G/DL] (ref 11–16)
LYMPHOCYTES # BLD AUTO: 1.9 [, K/UL] (ref 1–4.3)
LYMPHOCYTES NFR BLD AUTO: 21.4 [, %] (ref 20–40)
MCH RBC QN AUTO: 31.4 [, PG] (ref 27–31)
MCHC RBC AUTO-ENTMCNC: 33.2 [, G/DL] (ref 33–37)
MCV RBC AUTO: 94.7 [, FL] (ref 81–99)
MONOCYTES # BLD: 1.6 [, K/UL] (ref 0–0.8)
MONOCYTES NFR BLD: 18.2 [, %] (ref 0–10)
NEUTROPHILS # BLD: 4.5 [, K/UL] (ref 1.8–7)
NEUTROPHILS NFR BLD AUTO: 51.9 [, %] (ref 50–75)
NRBC BLD AUTO-RTO: 0 [, %] (ref 0–2)
PLATELET # BLD: 251 [, K/UL] (ref 130–400)
PMV BLD AUTO: 7.7 [, FL] (ref 7.2–11.7)
RBC # BLD AUTO: 3.21 [, MIL/UL] (ref 3.8–5.2)
WBC # BLD AUTO: 8.8 [, K/UL] (ref 4.8–10.8)

## 2018-04-18 PROCEDURE — B24BZZ4 ULTRASONOGRAPHY OF HEART WITH AORTA, TRANSESOPHAGEAL: ICD-10-PCS | Performed by: INTERNAL MEDICINE

## 2018-04-18 RX ADMIN — OXYCODONE HYDROCHLORIDE AND ACETAMINOPHEN PRN TAB: 5; 325 TABLET ORAL at 10:30

## 2018-04-18 RX ADMIN — OXYCODONE HYDROCHLORIDE AND ACETAMINOPHEN PRN TAB: 5; 325 TABLET ORAL at 22:01

## 2018-04-18 RX ADMIN — METOPROLOL SUCCINATE SCH MG: 100 TABLET, EXTENDED RELEASE ORAL at 10:26

## 2018-04-18 RX ADMIN — Medication SCH MG: at 17:41

## 2018-04-18 RX ADMIN — Medication SCH MG: at 10:25

## 2018-04-18 NOTE — CP.PCM.PN
Subjective





- Date & Time of Evaluation


Date of Evaluation: 04/18/18


Time of Evaluation: 18:30





- Subjective


Subjective: 





Patient s/p NIKKI





Freely mobile ECHO density seen in SVC and RA junction (Prior ian cath tip 

site) suggestive of vegetation/Infective Endocarditis





Full report to follow





Objective





- Vital Signs/Intake and Output


Vital Signs (last 24 hours): 


 











Temp Pulse Resp BP Pulse Ox


 


 98.2 F   70   20   146/71   98 


 


 04/18/18 16:00  04/18/18 16:00  04/18/18 16:00  04/18/18 16:00  04/18/18 16:00








Intake and Output: 


 











 04/18/18 04/19/18





 18:59 06:59


 


Intake Total 780 


 


Balance 780 














- Medications


Medications: 


 Current Medications





Ascorbic Acid (Vitamin C 500 Mg Tab)  1,500 mg PO DAILY [facility]


   Last Admin: 04/18/18 10:26 Dose:  1,500 mg


Cinacalcet (Sensipar)  30 mg PO DAILY [facility]


   Last Admin: 04/18/18 10:26 Dose:  30 mg


Epoetin Jeff (Procrit)  10,000 unit IV TTS [facility]


   Last Admin: 04/17/18 10:40 Dose:  10,000 unit


Heparin Sodium (Porcine) (Heparin)  5,000 units SC Q12 [facility]


   Last Admin: 04/18/18 10:26 Dose:  5,000 units


Vancomycin/Sodium Chloride (Vancomycin 1 Gm/Ns 200 Ml)  1 gm in 200 mls @ 166.7 

mls/hr IVPB TTS [facility]


   PRN Reason: Protocol


   Stop: 04/19/18 10:01


   Last Admin: 04/17/18 15:00 Dose:  166.7 mls/hr


Metoprolol Succinate (Toprol Xl)  100 mg PO DAILY [facility]


   Last Admin: 04/18/18 10:26 Dose:  100 mg


Oxycodone/Acetaminophen (Percocet 5/325 Mg Tab)  1 tab PO Q6H PRN


   PRN Reason: Pain, moderate (4-7)


   Stop: 04/18/18 21:56


   Last Admin: 04/18/18 10:30 Dose:  1 tab


Saccharomyces Boulardii (Florastor)  250 mg PO BID [facility]


   Last Admin: 04/18/18 17:41 Dose:  250 mg


Sertraline HCl (Zoloft)  50 mg PO BID [facility]


   Last Admin: 04/18/18 17:41 Dose:  50 mg











- Labs


Labs: 


 





 04/18/18 07:20 





 04/18/18 07:20 





 











PT  13.9 SECONDS (9.7-12.2)  H  04/16/18  07:41    


 


INR  1.2   04/16/18  07:41    


 


APTT  33 SECONDS (21-34)   04/12/18  13:32

## 2018-04-18 NOTE — CP.PCM.PN
Subjective





- Date & Time of Evaluation


Date of Evaluation: 04/18/18


Time of Evaluation: 08:00





- Subjective


Subjective: 





+ NIKKI


+ MRSA blood





cont rx x 6 weeks





Objective





- Vital Signs/Intake and Output


Vital Signs (last 24 hours): 


 











Temp Pulse Resp BP Pulse Ox


 


 97.9 F   93 H  20   133/73   97 


 


 04/18/18 08:00  04/18/18 08:00  04/18/18 08:00  04/18/18 08:00  04/18/18 08:00








Intake and Output: 


 











 04/18/18 04/18/18





 06:59 18:59


 


Intake Total 500 


 


Balance 500 














- Medications


Medications: 


 Current Medications





Ascorbic Acid (Vitamin C 500 Mg Tab)  1,500 mg PO DAILY [facility]


   Last Admin: 04/18/18 10:26 Dose:  1,500 mg


Cinacalcet (Sensipar)  30 mg PO DAILY [facility]


   Last Admin: 04/18/18 10:26 Dose:  30 mg


Epoetin Jeff (Procrit)  10,000 unit IV TTS [facility]


   Last Admin: 04/17/18 10:40 Dose:  10,000 unit


Heparin Sodium (Porcine) (Heparin)  5,000 units SC Q12 [facility]


   Last Admin: 04/18/18 10:26 Dose:  5,000 units


Vancomycin/Sodium Chloride (Vancomycin 1 Gm/Ns 200 Ml)  1 gm in 200 mls @ 166.7 

mls/hr IVPB TTS [facility]


   PRN Reason: Protocol


   Stop: 04/19/18 10:01


   Last Admin: 04/17/18 15:00 Dose:  166.7 mls/hr


Metoprolol Succinate (Toprol Xl)  100 mg PO DAILY [facility]


   Last Admin: 04/18/18 10:26 Dose:  100 mg


Oxycodone/Acetaminophen (Percocet 5/325 Mg Tab)  1 tab PO Q6H PRN


   PRN Reason: Pain, moderate (4-7)


   Stop: 04/18/18 21:56


   Last Admin: 04/18/18 10:30 Dose:  1 tab


Saccharomyces Boulardii (Florastor)  250 mg PO BID [facility]


   Last Admin: 04/18/18 10:25 Dose:  250 mg


Sertraline HCl (Zoloft)  50 mg PO BID [facility]


   Last Admin: 04/18/18 10:26 Dose:  50 mg











- Labs


Labs: 


 





 04/18/18 07:20 





 04/18/18 07:20 





 











PT  13.9 SECONDS (9.7-12.2)  H  04/16/18  07:41    


 


INR  1.2   04/16/18  07:41    


 


APTT  33 SECONDS (21-34)   04/12/18  13:32    














- Constitutional


Appears: Non-toxic, Chronically Ill





- Head Exam


Head Exam: NORMOCEPHALIC





- Eye Exam


Eye Exam: PERRL





- ENT Exam


ENT Exam: Mucous Membranes Dry





- Neck Exam


Neck Exam: absent: Lymphadenopathy





- Respiratory Exam


Respiratory Exam: Decreased Breath Sounds





- Cardiovascular Exam


Cardiovascular Exam: REGULAR RHYTHM





- GI/Abdominal Exam


GI & Abdominal Exam: Distended, Soft





Assessment and Plan


(1) Bacteremia due to Gram-positive bacteria


Status: Acute   





(2) CHF (congestive heart failure)


Status: Acute   





(3) ESRD (end stage renal disease) on dialysis


Status: Acute   





(4) Positive blood culture


Status: Acute   





(5) Appendicitis


Status: Acute   





(6) Bacteremia due to Gram-positive bacteria


Status: Acute   





(7) CHF (congestive heart failure)


Status: Acute   





(8) End stage renal disease


Status: Acute

## 2018-04-18 NOTE — CP.PCM.PN
Subjective





- Date & Time of Evaluation


Date of Evaluation: 04/18/18


Time of Evaluation: 07:00





- Subjective


Subjective: 





PGY 2 Med Note- Dr. Arita's service





Patient seen and examined in no apparent acute distress. Patient denies 

subjective fevers or chills, nausea, vomiting, diarrhea or constipation at this 

time. Patient is for NIKKI this morning with Dr. Randall.





Objective





- Vital Signs/Intake and Output


Vital Signs (last 24 hours): 


 











Temp Pulse Resp BP Pulse Ox


 


 98.4 F   69   20   121/66   98 


 


 04/18/18 06:50  04/18/18 06:50  04/18/18 06:50  04/18/18 06:50  04/18/18 06:50








Intake and Output: 


 











 04/18/18 04/18/18





 06:59 18:59


 


Intake Total 500 


 


Balance 500 














- Medications


Medications: 


 Current Medications





Ascorbic Acid (Vitamin C 500 Mg Tab)  1,500 mg PO DAILY [facility]


   Last Admin: 04/17/18 12:25 Dose:  Not Given


Cinacalcet (Sensipar)  30 mg PO DAILY [facility]


   Last Admin: 04/17/18 12:24 Dose:  Not Given


Epoetin Jeff (Procrit)  10,000 unit IV TTS [facility]


   Last Admin: 04/17/18 10:40 Dose:  10,000 unit


Heparin Sodium (Porcine) (Heparin)  5,000 units SC Q12 [facility]


   Last Admin: 04/17/18 21:23 Dose:  Not Given


Vancomycin/Sodium Chloride (Vancomycin 1 Gm/Ns 200 Ml)  1 gm in 200 mls @ 166.7 

mls/hr IVPB TTS [facility]


   PRN Reason: Protocol


   Stop: 04/19/18 10:01


   Last Admin: 04/17/18 15:00 Dose:  166.7 mls/hr


Metoprolol Succinate (Toprol Xl)  100 mg PO DAILY [facility]


   Last Admin: 04/17/18 15:00 Dose:  100 mg


Oxycodone/Acetaminophen (Percocet 5/325 Mg Tab)  1 tab PO Q6H PRN


   PRN Reason: Pain, moderate (4-7)


   Stop: 04/18/18 21:56


   Last Admin: 04/17/18 21:26 Dose:  1 tab


Saccharomyces Boulardii (Florastor)  250 mg PO BID [facility]


   Last Admin: 04/17/18 18:08 Dose:  250 mg


Sertraline HCl (Zoloft)  50 mg PO BID [facility]


   Last Admin: 04/17/18 18:08 Dose:  50 mg











- Labs


Labs: 


 





 04/17/18 06:38 





 04/17/18 06:38 





 











PT  13.9 SECONDS (9.7-12.2)  H  04/16/18  07:41    


 


INR  1.2   04/16/18  07:41    


 


APTT  33 SECONDS (21-34)   04/12/18  13:32    














- Constitutional


Appears: Non-toxic, No Acute Distress





- Head Exam


Head Exam: ATRAUMATIC, NORMAL INSPECTION





- Eye Exam


Eye Exam: EOMI


Pupil Exam: NORMAL ACCOMODATION





- ENT Exam


ENT Exam: Mucous Membranes Moist





- Respiratory Exam


Respiratory Exam: Clear to Ausculation Bilateral, NORMAL BREATHING PATTERN.  

absent: Respiratory Distress





- Cardiovascular Exam


Cardiovascular Exam: REGULAR RHYTHM, +S1, +S2





- GI/Abdominal Exam


GI & Abdominal Exam: Soft, Normal Bowel Sounds.  absent: Distended, Firm, 

Guarding, Tenderness





- Extremities Exam


Additional comments: 





bowing of left leg, soft cusion brace noted





- Back Exam


Back Exam: NORMAL INSPECTION





- Neurological Exam


Neurological Exam: Alert, Awake, CN II-XII Intact, Oriented x3





- Psychiatric Exam


Psychiatric exam: Normal Affect, Normal Mood





- Skin


Skin Exam: Normal Color





Assessment and Plan





- Assessment and Plan (Free Text)


Assessment: 








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


Blood Cultures from 4/12 negative  x5 days


Dr. Randall, Cardiologist - NIKKI on 4/18 showed vegetation in the SVC


Dr. Gregorio, ID, consulted help appreciated


On Vancomycin during dialysis. Patient will need to continue for 6 more weeks.


On Florastor


Dr. Mock removed the port on 4/17 PM





Bacteremia


Blood Cultures from 4/12 negative  x5 days


F/U NIKKI Echo. Had to be rescheduled. Will follow up on date.


Dr. Randall, Cardiologist  on the case- F/U recommendations


On Vancomycin during dialysis


On Florastor





ESRD (end stage renal disease) on dialysis


on T, Th, Sat


On Sensipar and Epo


Nephrology on the case. F/U recommendations


Will need to use HD shut for further access





HTN


Metoprolol daily


Continue to monitor





Leg Pain


Physical Therapy 


Continue PT while in house. Patient does not  desire LAVON.





Prophylactic Measure 


Hep SC Q12


SCDs


GI PPx not currently indicated








Discussed with attending. All planning and management per Dr. Arita.

## 2018-04-18 NOTE — CP.PCM.PN
Subjective





- Date & Time of Evaluation


Date of Evaluation: 04/18/18


Time of Evaluation: 16:26





- Subjective


Subjective: 





s/p caesar today


results pending


no fever


chronic sob


no n/v/diarrha


anuric


no abdominal pain


no palpitations


no increased thirst


no headache


no sinus tenderness





Objective





- Vital Signs/Intake and Output


Vital Signs (last 24 hours): 


 











Temp Pulse Resp BP Pulse Ox


 


 98.2 F   70   20   146/71   98 


 


 04/18/18 16:00  04/18/18 16:00  04/18/18 16:00  04/18/18 16:00  04/18/18 16:00








Intake and Output: 


 











 04/18/18 04/18/18





 06:59 18:59


 


Intake Total 500 780


 


Balance 500 780














- Medications


Medications: 


 Current Medications





Ascorbic Acid (Vitamin C 500 Mg Tab)  1,500 mg PO DAILY [facility]


   Last Admin: 04/18/18 10:26 Dose:  1,500 mg


Cinacalcet (Sensipar)  30 mg PO DAILY [facility]


   Last Admin: 04/18/18 10:26 Dose:  30 mg


Epoetin Jeff (Procrit)  10,000 unit IV TTS [facility]


   Last Admin: 04/17/18 10:40 Dose:  10,000 unit


Heparin Sodium (Porcine) (Heparin)  5,000 units SC Q12 [facility]


   Last Admin: 04/18/18 10:26 Dose:  5,000 units


Vancomycin/Sodium Chloride (Vancomycin 1 Gm/Ns 200 Ml)  1 gm in 200 mls @ 166.7 

mls/hr IVPB TTS [facility]


   PRN Reason: Protocol


   Stop: 04/19/18 10:01


   Last Admin: 04/17/18 15:00 Dose:  166.7 mls/hr


Metoprolol Succinate (Toprol Xl)  100 mg PO DAILY [facility]


   Last Admin: 04/18/18 10:26 Dose:  100 mg


Oxycodone/Acetaminophen (Percocet 5/325 Mg Tab)  1 tab PO Q6H PRN


   PRN Reason: Pain, moderate (4-7)


   Stop: 04/18/18 21:56


   Last Admin: 04/18/18 10:30 Dose:  1 tab


Saccharomyces Boulardii (Florastor)  250 mg PO BID [facility]


   Last Admin: 04/18/18 10:25 Dose:  250 mg


Sertraline HCl (Zoloft)  50 mg PO BID [facility]


   Last Admin: 04/18/18 10:26 Dose:  50 mg











- Labs


Labs: 


 





 04/18/18 07:20 





 04/18/18 07:20 





 











PT  13.9 SECONDS (9.7-12.2)  H  04/16/18  07:41    


 


INR  1.2   04/16/18  07:41    


 


APTT  33 SECONDS (21-34)   04/12/18  13:32    














- Constitutional


Appears: No Acute Distress, Chronically Ill





- Head Exam


Head Exam: ATRAUMATIC, NORMAL INSPECTION





- Eye Exam


Eye Exam: EOMI


Pupil Exam: NORMAL ACCOMODATION





- ENT Exam


ENT Exam: Mucous Membranes Moist





- Neck Exam


Neck Exam: Full ROM.  absent: Lymphadenopathy





- Respiratory Exam


Respiratory Exam: Decreased Breath Sounds, Rales.  absent: Accessory Muscle Use





- Cardiovascular Exam


Cardiovascular Exam: REGULAR RHYTHM.  absent: Rubs





- GI/Abdominal Exam


GI & Abdominal Exam: Soft.  absent: Tenderness





- Extremities Exam


Additional comments: 





right arm malrotated





- Neurological Exam


Neurological Exam: Alert, Awake, Oriented x3





Assessment and Plan





- Assessment and Plan (Free Text)


Assessment: 





esrd


mrsa bacteremia, on AB


cvc removed


f/u caesar


HD in am

## 2018-04-19 VITALS
OXYGEN SATURATION: 99 % | DIASTOLIC BLOOD PRESSURE: 45 MMHG | RESPIRATION RATE: 20 BRPM | SYSTOLIC BLOOD PRESSURE: 152 MMHG | HEART RATE: 79 BPM | TEMPERATURE: 97.9 F

## 2018-04-19 LAB
ALBUMIN SERPL-MCNC: 3.3 [, G/DL] (ref 3.5–5)
ALBUMIN/GLOB SERPL: 0.8 [,] (ref 1–2.1)
ALT SERPL-CCNC: 17 [, U/L] (ref 9–52)
AST SERPL-CCNC: 31 [, U/L] (ref 14–36)
BASOPHILS # BLD AUTO: 0.1 [, K/UL] (ref 0–0.2)
BASOPHILS NFR BLD: 1 [, %] (ref 0–2)
BUN SERPL-MCNC: 35 [, MG/DL] (ref 7–17)
CALCIUM SERPL-MCNC: 6.9 [, MG/DL] (ref 8.6–10.4)
EOSINOPHIL # BLD AUTO: 0.5 [, K/UL] (ref 0–0.7)
EOSINOPHIL NFR BLD: 5.4 [, %] (ref 0–4)
ERYTHROCYTE [DISTWIDTH] IN BLOOD BY AUTOMATED COUNT: 16.3 [, %] (ref 11.5–14.5)
GFR NON-AFRICAN AMERICAN: 8 [,]
HGB BLD-MCNC: 10.2 [, G/DL] (ref 11–16)
LYMPHOCYTES # BLD AUTO: 2.1 [, K/UL] (ref 1–4.3)
LYMPHOCYTES NFR BLD AUTO: 21.3 [, %] (ref 20–40)
MCH RBC QN AUTO: 31.6 [, PG] (ref 27–31)
MCHC RBC AUTO-ENTMCNC: 33.4 [, G/DL] (ref 33–37)
MCV RBC AUTO: 94.7 [, FL] (ref 81–99)
MONOCYTES # BLD: 1.9 [, K/UL] (ref 0–0.8)
MONOCYTES NFR BLD: 19.7 [, %] (ref 0–10)
NEUTROPHILS # BLD: 5.1 [, K/UL] (ref 1.8–7)
NEUTROPHILS NFR BLD AUTO: 52.6 [, %] (ref 50–75)
NRBC BLD AUTO-RTO: 0 [, %] (ref 0–2)
PLATELET # BLD: 240 [, K/UL] (ref 130–400)
PMV BLD AUTO: 7.9 [, FL] (ref 7.2–11.7)
RBC # BLD AUTO: 3.21 [, MIL/UL] (ref 3.8–5.2)
WBC # BLD AUTO: 9.7 [, K/UL] (ref 4.8–10.8)

## 2018-04-19 RX ADMIN — Medication SCH MG: at 18:37

## 2018-04-19 RX ADMIN — Medication SCH MG: at 09:40

## 2018-04-19 RX ADMIN — METOPROLOL SUCCINATE SCH: 100 TABLET, EXTENDED RELEASE ORAL at 14:20

## 2018-04-19 RX ADMIN — ERYTHROPOIETIN SCH UNIT: 10000 INJECTION, SOLUTION INTRAVENOUS; SUBCUTANEOUS at 14:34

## 2018-04-19 RX ADMIN — VANCOMYCIN HYDROCHLORIDE SCH MLS/HR: 1 INJECTION, POWDER, LYOPHILIZED, FOR SOLUTION INTRAVENOUS at 16:20

## 2018-04-19 NOTE — CP.PCM.PN
Subjective





- Date & Time of Evaluation


Date of Evaluation: 04/19/18


Time of Evaluation: 07:00





- Subjective


Subjective: 





PGY 2 Med Note- Dr. Arita's service





Patient seen and examined in no apparent acute distress. Patient denies 

subjective fevers or chills, nausea, vomiting, diarrhea or constipation at this 

time. 





Objective





- Vital Signs/Intake and Output


Vital Signs (last 24 hours): 


 











Temp Pulse Resp BP Pulse Ox


 


 97.5 F L  69   20   144/71   99 


 


 04/19/18 00:00  04/19/18 00:00  04/19/18 00:00  04/19/18 00:00  04/19/18 00:00








Intake and Output: 


 











 04/19/18 04/19/18





 06:59 18:59


 


Intake Total 500 


 


Balance 500 














- Medications


Medications: 


 Current Medications





Ascorbic Acid (Vitamin C 500 Mg Tab)  1,500 mg PO DAILY [facility]


   Last Admin: 04/18/18 10:26 Dose:  1,500 mg


Cinacalcet (Sensipar)  30 mg PO DAILY [facility]


   Last Admin: 04/18/18 10:26 Dose:  30 mg


Epoetin Jeff (Procrit)  10,000 unit IV TTS [facility]


   Last Admin: 04/17/18 10:40 Dose:  10,000 unit


Heparin Sodium (Porcine) (Heparin)  5,000 units SC Q12 [facility]


   Last Admin: 04/18/18 21:57 Dose:  Not Given


Vancomycin/Sodium Chloride (Vancomycin 1 Gm/Ns 200 Ml)  1 gm in 200 mls @ 166.7 

mls/hr IVPB TTS [facility]


   PRN Reason: Protocol


   Stop: 04/19/18 10:01


   Last Admin: 04/17/18 15:00 Dose:  166.7 mls/hr


Metoprolol Succinate (Toprol Xl)  100 mg PO DAILY [facility]


   Last Admin: 04/18/18 10:26 Dose:  100 mg


Saccharomyces Boulardii (Florastor)  250 mg PO BID [facility]


   Last Admin: 04/18/18 17:41 Dose:  250 mg


Sertraline HCl (Zoloft)  50 mg PO BID [facility]


   Last Admin: 04/18/18 17:41 Dose:  50 mg











- Labs


Labs: 


 





 04/18/18 07:20 





 04/18/18 07:20 





 











PT  13.9 SECONDS (9.7-12.2)  H  04/16/18  07:41    


 


INR  1.2   04/16/18  07:41    


 


APTT  33 SECONDS (21-34)   04/12/18  13:32    














- Constitutional


Appears: Non-toxic, In Acute Distress





- Head Exam


Head Exam: ATRAUMATIC, NORMAL INSPECTION





- Eye Exam


Eye Exam: EOMI


Pupil Exam: NORMAL ACCOMODATION





- ENT Exam


ENT Exam: Mucous Membranes Moist





- Respiratory Exam


Respiratory Exam: Clear to Ausculation Bilateral, NORMAL BREATHING PATTERN.  

absent: Respiratory Distress





- Cardiovascular Exam


Cardiovascular Exam: REGULAR RHYTHM, +S1, +S2





- Extremities Exam


Additional comments: 





bowing of left leg, soft cusion brace noted





- Back Exam


Back Exam: NORMAL INSPECTION





- Neurological Exam


Neurological Exam: Alert, Awake, CN II-XII Intact, Normal Gait, Oriented x3





- Psychiatric Exam


Psychiatric exam: Normal Affect, Normal Mood





- Skin


Skin Exam: Dry, Intact, Normal Color





Assessment and Plan





- Assessment and Plan (Free Text)


Assessment: 





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


Blood Cultures from 4/12 negative  x5 days


Dr. Randall, Cardiologist - NIKKI on 4/18 showed density in SVC and RA junction (

Prior ian cath tip site) suggestive of vegetation/Infective Endocarditis


Dr. Gregorio, ID, consulted help appreciated


On Vancomycin during dialysis. Patient will need to continue for 6 more weeks.


On Florastor


Dr. Mock removed the port on 4/17 PM





Bacteremia


Blood Cultures from 4/12 negative  x5 days


On Vancomycin during dialysis x 6 more weeks


On Florastor





ESRD (end stage renal disease) on dialysis


on T, Th, Sat


Pt due to dialysis today


On Sensipar and Epo


Nephrology on the case. 


Will need to use HD shut for further access





HTN


Metoprolol daily


Continue to monitor





Leg Pain


Physical Therapy 


Continue PT while in house. Patient does not  desire [facility].





COPD


Controlled


Patient is on home O2 a 3 L per minute


She is stating today that her portable machine is broken at home


Will write script for portable home O2 machine at3 L continuous


Patient to f.u outpatient with Dr. Arita


[facility]





Prophylactic Measure 


Hep SC Q12


SCDs


GI PPx not currently indicated








Discussed with attending. All planning and management per Dr. Arita.

## 2018-04-19 NOTE — CP.PCM.PN
Subjective





- Date & Time of Evaluation


Date of Evaluation: 04/19/18


Time of Evaluation: 11:07





- Subjective


Subjective: 





seen and examined


feels well


for hd today


caesar noted, suspicion for endocarditis








Objective





- Vital Signs/Intake and Output


Vital Signs (last 24 hours): 


 











Temp Pulse Resp BP Pulse Ox


 


 98.9 F   68   20   138/73   99 


 


 04/19/18 07:58  04/19/18 07:58  04/19/18 07:58  04/19/18 07:58  04/19/18 07:58








Intake and Output: 


 











 04/19/18 04/19/18





 06:59 18:59


 


Intake Total 500 


 


Balance 500 














- Medications


Medications: 


 Current Medications





Ascorbic Acid (Vitamin C 500 Mg Tab)  1,500 mg PO DAILY [facility]


   Last Admin: 04/19/18 09:40 Dose:  1,500 mg


Cinacalcet (Sensipar)  30 mg PO DAILY [facility]


   Last Admin: 04/19/18 09:40 Dose:  30 mg


Epoetin Jeff (Procrit)  10,000 unit IV TTS [facility]


   Last Admin: 04/17/18 10:40 Dose:  10,000 unit


Heparin Sodium (Porcine) (Heparin)  5,000 units SC Q12 [facility]


   Last Admin: 04/18/18 21:57 Dose:  Not Given


Metoprolol Succinate (Toprol Xl)  100 mg PO DAILY [facility]


   Last Admin: 04/18/18 10:26 Dose:  100 mg


Saccharomyces Boulardii (Florastor)  250 mg PO BID [facility]


   Last Admin: 04/19/18 09:40 Dose:  250 mg


Sertraline HCl (Zoloft)  50 mg PO BID [facility]


   Last Admin: 04/19/18 09:41 Dose:  50 mg











- Labs


Labs: 


 





 04/18/18 07:20 





 04/18/18 07:20 





 











PT  13.9 SECONDS (9.7-12.2)  H  04/16/18  07:41    


 


INR  1.2   04/16/18  07:41    


 


APTT  33 SECONDS (21-34)   04/12/18  13:32    














- Constitutional


Appears: Non-toxic, No Acute Distress, Chronically Ill





- Head Exam


Head Exam: NORMAL INSPECTION





- Eye Exam


Eye Exam: Normal appearance, PERRL





- ENT Exam


ENT Exam: Mucous Membranes Moist, Normal Exam





- Respiratory Exam


Respiratory Exam: Clear to Ausculation Bilateral, NORMAL BREATHING PATTERN





- Cardiovascular Exam


Cardiovascular Exam: Tachycardia, REGULAR RHYTHM, RRR





- GI/Abdominal Exam


GI & Abdominal Exam: Distended, Soft, Normal Bowel Sounds





- Extremities Exam


Extremities Exam: Normal Inspection (RLE in boot, lue avf)





- Neurological Exam


Neurological Exam: Alert, Awake, Oriented x3





- Psychiatric Exam


Psychiatric exam: Normal Affect, Normal Mood





- Skin


Skin Exam: Dry, Intact, Normal Color





Assessment and Plan


(1) Bacteremia due to Gram-positive bacteria


Status: Acute   





(2) CHF (congestive heart failure)


Status: Acute   





(3) ESRD (end stage renal disease) on dialysis


Status: Acute   





(4) Positive blood culture


Status: Acute   





(5) HTN (hypertension)


Status: Acute   





- Assessment and Plan (Free Text)


Assessment: 





vancomycin w/ hd, duration per ID


hd tts


f/u echo outpt


stable from renal standpoint

## 2018-04-22 NOTE — CARD
--------------- APPROVED REPORT --------------





EXAM: Transesophageal echocardiogram with color flow Doppler.



INDICATION

Infection : Rule out subacute bacterial endocarditis 



Mitral Valve

E/A ratio0.0



TDI

E/Lateral E'0.0E/Medial E'0.0



Reason For Test : Rule out endocarditis.



PROCEDURE

After obtaining informed consent, patient underwent transesophageal 

echo in the [facility].

Type of Sedation : Conscious Sedation

Sedation was provided by anesthesiologist.

Sedation was achieved with   intravenously. 

The NIKKI was performed  complications. 

Throughout the procedure, the blood pressure, pulse oximetry, cardiac 

rhythm, and rate were monitored.

The patient tolerated the procedure without adverse effects. Recovery 

from conscious sedation was uneventful and vital signs were stable.



 LEFT VENTRICLE 

The left ventricle is normal size.

There is normal left ventricular wall thickness.

Left ventricle systolic function is normal.

The Ejection Fraction is >55%.

There is normal LV segmental wall motion.

No left ventricle thrombus noted on this study.

There is no ventricular septal defect visualized.



 RIGHT VENTRICLE 

The right ventricle is moderately dilated.

The right ventricular systolic function is normal.



 ATRIA 

The left atrium size is normal.

The right atrium is moderately dilated. 

The interatrial septum is intact with no evidence for an atrial 

septal defect.



 AORTIC VALVE 

The aortic valve is normal in structure.

No aortic regurgitation is present.

There is no aortic valvular stenosis. 

There is no aortic valvular vegetation.



 MITRAL VALVE 

The mitral valve is normal in structure. No mitral valve vegetation 

seen

There is no mitral valve stenosis.

Mitral regurgitation is mild.



 TRICUSPID VALVE 

The tricuspid valve is normal in structure.

There is moderate to severe tricuspid regurgitation.

There is no tricuspid valve prolapse or vegetation.

There is no tricuspid valve stenosis. 



 PULMONIC VALVE 

The pulmonary valve is normal in structure.

There is no pulmonic valvular regurgitation. 

There is no pulmonic valvular stenosis.



 GREAT VESSELS 

The aortic root is normal in size.

There is 2.1cm long freely mobile mass seen attached to SVC at the 

site of SVC and RA junction. Most likely vegetation given the 

clinical presentation and the positive blood cutltures





<Conclusion>

Left ventricle systolic function is normal.

The Ejection Fraction is >55%.

The right ventricle is moderately dilated.

The right ventricular systolic function is normal.

The right atrium is moderately dilated.

The interatrial septum is intact with no evidence for an atrial 

septal defect.

The aortic valve is normal in structure.

There is no aortic valvular vegetation.

The mitral valve is normal in structure.

Mitral regurgitation is mild.

The mitral valve is normal in structure. No mitral valve vegetation 

seen

The tricuspid valve is normal in structure.

There is moderate to severe tricuspid regurgitation.

There is no tricuspid valve prolapse or vegetation.

The pulmonary valve is normal in structure.

The aortic root is normal in size.

There is 2.1cm long freely mobile mass seen attached to SVC at the 

site of SVC and RA junction. Most likely vegetation given the 

clinical presentation and the positive blood cutltures

Infectictive Endocarditis at the site of previous catheter site 

(SVC/RA junction)

## 2018-04-30 NOTE — DS
The patient admitted to the hospital with a chief complaint of weakness,

fatigue, tiredness. Came to the ER, advised admission.  The patient was

placed on bedrest, supportive care.  The patient showed gradual

improvement, discharged, to be followed up as an outpatient.





__________________________________________

Raven Arita MD





DD:  04/28/2018 18:54:01

DT:  04/28/2018 20:55:25

Job # [id number]

## 2018-06-29 ENCOUNTER — HOSPITAL ENCOUNTER (OUTPATIENT)
Dept: HOSPITAL 31 - C.ER | Age: 68
Setting detail: OBSERVATION
LOS: 1 days | Discharge: HOME | End: 2018-06-30
Attending: SURGERY | Admitting: SURGERY
Payer: MEDICARE

## 2018-06-29 VITALS — BODY MASS INDEX: 23.8 KG/M2

## 2018-06-29 DIAGNOSIS — I50.9: ICD-10-CM

## 2018-06-29 DIAGNOSIS — N18.6: ICD-10-CM

## 2018-06-29 DIAGNOSIS — M79.7: ICD-10-CM
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
## 2018-06-29 DIAGNOSIS — I13.2: ICD-10-CM

## 2018-06-29 DIAGNOSIS — M81.0: ICD-10-CM
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
## 2018-06-29 DIAGNOSIS — T82.838A: Primary | ICD-10-CM

## 2018-06-29 DIAGNOSIS — Z86.73: ICD-10-CM

## 2018-06-29 DIAGNOSIS — T86.12: ICD-10-CM

## 2018-06-29 LAB
ALBUMIN SERPL-MCNC: 3.6 G/DL (ref 3.5–5)
ALBUMIN/GLOB SERPL: 1 {RATIO} (ref 1–2.1)
ALT SERPL-CCNC: 29 U/L (ref 9–52)
APTT BLD: 29 SECONDS (ref 21–34)
AST SERPL-CCNC: 66 U/L (ref 14–36)
BASOPHILS # BLD AUTO: 0.1 K/UL (ref 0–0.2)
BASOPHILS NFR BLD: 1.3 % (ref 0–2)
BUN SERPL-MCNC: 34 MG/DL (ref 7–17)
CALCIUM SERPL-MCNC: 8.2 MG/DL (ref 8.6–10.4)
EOSINOPHIL # BLD AUTO: 0.5 K/UL (ref 0–0.7)
EOSINOPHIL NFR BLD: 7 % (ref 0–4)
ERYTHROCYTE [DISTWIDTH] IN BLOOD BY AUTOMATED COUNT: 17.6 % (ref 11.5–14.5)
GFR NON-AFRICAN AMERICAN: 14
HGB BLD-MCNC: 9.8 G/DL (ref 11–16)
INR PPP: 1.2
LYMPHOCYTES # BLD AUTO: 1.3 K/UL (ref 1–4.3)
LYMPHOCYTES NFR BLD AUTO: 17.3 % (ref 20–40)
MCH RBC QN AUTO: 30.7 PG (ref 27–31)
MCHC RBC AUTO-ENTMCNC: 33.6 G/DL (ref 33–37)
MCV RBC AUTO: 91.5 FL (ref 81–99)
MONOCYTES # BLD: 1.4 K/UL (ref 0–0.8)
MONOCYTES NFR BLD: 18.6 % (ref 0–10)
NEUTROPHILS # BLD: 4.3 K/UL (ref 1.8–7)
NEUTROPHILS NFR BLD AUTO: 55.8 % (ref 50–75)
NRBC BLD AUTO-RTO: 0 % (ref 0–2)
PLATELET # BLD: 236 K/UL (ref 130–400)
PMV BLD AUTO: 7.8 FL (ref 7.2–11.7)
PROTHROMBIN TIME: 12.7 SECONDS (ref 9.7–12.2)
RBC # BLD AUTO: 3.2 MIL/UL (ref 3.8–5.2)
WBC # BLD AUTO: 7.8 K/UL (ref 4.8–10.8)

## 2018-06-29 PROCEDURE — 71046 X-RAY EXAM CHEST 2 VIEWS: CPT

## 2018-06-29 PROCEDURE — 85730 THROMBOPLASTIN TIME PARTIAL: CPT

## 2018-06-29 PROCEDURE — 86900 BLOOD TYPING SEROLOGIC ABO: CPT

## 2018-06-29 PROCEDURE — 85610 PROTHROMBIN TIME: CPT

## 2018-06-29 PROCEDURE — 82948 REAGENT STRIP/BLOOD GLUCOSE: CPT

## 2018-06-29 PROCEDURE — 85025 COMPLETE CBC W/AUTO DIFF WBC: CPT

## 2018-06-29 PROCEDURE — 36415 COLL VENOUS BLD VENIPUNCTURE: CPT

## 2018-06-29 PROCEDURE — 85027 COMPLETE CBC AUTOMATED: CPT

## 2018-06-29 PROCEDURE — 86920 COMPATIBILITY TEST SPIN: CPT

## 2018-06-29 PROCEDURE — 99285 EMERGENCY DEPT VISIT HI MDM: CPT

## 2018-06-29 PROCEDURE — 36832 AV FISTULA REVISION OPEN: CPT

## 2018-06-29 PROCEDURE — 80053 COMPREHEN METABOLIC PANEL: CPT

## 2018-06-29 PROCEDURE — 86850 RBC ANTIBODY SCREEN: CPT

## 2018-06-29 NOTE — C.PDOC
History Of Present Illness


67 Y/O FEMALE PRESENTS TO ED SENT BY DR. LYLE FOR FURTHER EVALUATION OF 

BLEEDING FROM AV SHUNT ON LEFT ARM SINCE YESTERDAY. AT ED PATIENT C/O 

PERSISTENT BLEEDING AND DENIES WEAKNESS, FEVER, CHILLS, NAUSEA, VOMITING, 

NUMBNESS OR ANY OTHER COMPLAINTS AT THIS TIME. 


Time Seen by Provider: 06/29/18 10:44


Chief Complaint (Nursing): Vascular Access Device Problem


History Per: Patient


History/Exam Limitations: no limitations


Onset/Duration Of Symptoms: Days


Current Symptoms Are (Timing): Still Present





Past Medical History


Reviewed: Historical Data, Nursing Documentation, Vital Signs


Vital Signs: 


 Last Vital Signs











Temp  99.1 F   06/29/18 10:27


 


Pulse  70   06/29/18 10:27


 


Resp  20   06/29/18 10:27


 


BP  118/61   06/29/18 10:27


 


Pulse Ox  96   06/29/18 10:59














- Medical History


PMH: Anxiety, Arthritis (OA), CHF, Diverticulitis, Fibromyalgia, Fractures (

Current right leg fx, RUE, LLE), HTN, Osteoporosis, Pancreatitis, Pneumonia, 

End Stage Renal Disease, Chronic Kidney Disease, TIA


Surgical History: Cholecystectomy





- [facility]








 (04/12/18)


BUNIONECT/SFT/OSTEOTOMY (12/13/13)


DILATION OF L SUBCLAV VEIN WITH INTRALUM DEV, PERC APPROACH (02/10/18)


IMMOBILIZATION OF LEFT LOWER LEG USING SPLINT (11/06/17)


IMMOBILIZATION OF RIGHT UPPER EXTREMITY USING SPLINT (11/06/17)


INSERT INFUSION DEV IN R INT JUGULAR VEIN, PERC (02/10/18)


OPEN REDUCT-INT FIX TOE (12/13/13)


REVISION OF INFUSION DEVICE IN UPPER ARTERY, OPEN APPROACH (02/10/18)


ULTRASONOGRAPHY OF HEART WITH AORTA, TRANSESOPHAGEAL (04/12/18)


ULTRASONOGRAPHY OF RIGHT JUGULAR VEINS, GUIDANCE (02/10/18)








Family History: States: No Known Family Hx





- Social History


Hx Alcohol Use: No


Hx Substance Use: No





- Immunization History


Hx Tetanus Toxoid Vaccination: No


Hx Influenza Vaccination: No


Hx Pneumococcal Vaccination: No





Review Of Systems


Constitutional: Negative for: Fever, Chills


Gastrointestinal: Negative for: Nausea, Vomiting


Skin: Positive for: Other (AV shunt bleeding left forearm)


Neurological: Negative for: Weakness, Numbness





Physical Exam





- Physical Exam


Appears: Non-toxic, No Acute Distress


Skin: Warm, Dry, Other (AV shunt to left forearm with compressive bandage in 

place administered in clinic. )


Head: Atraumatic, Normacephalic


Eye(s): bilateral: Normal Inspection


Oral Mucosa: Moist


Neck: Normal ROM, Supple


Cardiovascular: Rhythm Regular


Respiratory: Normal Breath Sounds, No Rales, No Rhonchi, No Wheezing


Extremity: Capillary Refill (<2 seconds), No Deformity, Other (cast to left 

lower leg)


Neurological/Psych: Oriented x3, Normal Speech





ED Course And Treatment


O2 Sat by Pulse Oximetry: 96 (RA)





Progress





- Re-Evaluation


Re-evaluation Note: 





06/29/18 10:46


D/W SURG RESIDENT WILL EVAL INER


06/29/18 10:59


PER REFERRAL RX FROM DR LYLE, ADMIT TO HIS SERVICE, PREOP LABS





- Data Reviewed


Data Reviewed: Lab, Diagnostic imaging, Old records





Disposition


Counseled Patient/Family Regarding: Studies Performed, Diagnosis





- Disposition


Disposition: HOSPITALIZED


Disposition Time: 10:58


Condition: STABLE


Forms:  [facility] (English)





- POA


Present On Arrival: None





- Clinical Impression


Clinical Impression: 


 Hemorrhage of arteriovenous fistula








- Scribe Statement


The provider has reviewed the documentation as recorded by the Scribe


Kyaw Junior





All medical record entries made by the Scribe were at my direction and 

personally dictated by me. I have reviewed the chart and agree that the record 

accurately reflects my personal performance of the history, physical exam, 

medical decision making, and the department course for this patient. I have 

also personally directed, reviewed, and agree with the discharge instructions 

and disposition.





Decision To Admit





- Pt Status Changed To:


Hospital Disposition Of: [facility]- Endo,OR,Cath,IR





- .


Bed Request Type: Same Day Surgery


Admitting Physician: Sandip Lyle Jr.


Patient Diagnosis: 


 Hemorrhage of arteriovenous fistula

## 2018-06-29 NOTE — RAD
HISTORY:

Pre Op  



COMPARISON:

4/13/2018



TECHNIQUE:

Chest PA and lateral



FINDINGS:



LUNGS:

No active pulmonary disease.



PLEURA:

No significant pleural effusion identified. No pneumothorax apparent.



CARDIOVASCULAR:

Normal.



OSSEOUS STRUCTURES:

No significant abnormalities.



VISUALIZED UPPER ABDOMEN:

Normal.



OTHER FINDINGS:

None.



IMPRESSION:

No active disease.

## 2018-06-29 NOTE — PCM.SURG1
Surgeon's Initial Post Op Note





- Surgeon's Notes


Surgeon: nitish


Assistant: 0


Type of Anesthesia: General Endo, IV Sedation


Anesthesia Administered By: luis a


Pre-Operative Diagnosis: bleeding left arm fistula


Operative Findings: bleeding from venous side of old avf


Post-Operative Diagnosis: same


Operation Performed: revision of avf with ligation of venous side of old avf


Specimen/Specimens Removed: 0


Estimated Blood Loss: EBL {In ML}: 399


Blood Products Given: N/A


Drains Used: No Drains


Post-Op Condition: Good


Date of Surgery/Procedure: 06/29/18


Time of Surgery/Procedure: 15:33

## 2018-06-29 NOTE — CP.SDSHP
Same Day Surgery H & P





- History


Proposed Procedure: repair of bleeding fistula


Pre-Op Diagnosis: Repair of bleeding fistula





- Allergies


Allergies: 


Allergies





aspirin Allergy (Verified 04/12/18 12:54)


 NAUSEA


lactose Allergy (Verified 04/12/18 12:54)


 NAUSEA











- Physical Exam


General Appearance: NAD


Vital Signs: 


 Vital Signs











  06/29/18 06/29/18





  10:27 11:08


 


Temperature 99.1 F 


 


Pulse Rate 70 


 


Respiratory 20 





Rate  


 


Blood Pressure 118/61 


 


O2 Sat by Pulse 96 96





Oximetry  











Mental Status: Alert & Oriented x3


Neuro: WNL


Heart: WNL


Lungs: WNL


GI: WNL





- {Optional Preform as Required}


Abdomen: WNL


Integument: Other (dressing on L arm C/D/I. Good thrills.)





- Impression


Pt. Evaluated Today:Candidate for Anesthesia & Procedure: Yes





- Date & Time


Date: 06/29/18


Time: 12:46





Short Stay Discharge





- Short Stay Discharge


Admitting Diagnosis/Reason for Visit: SENT BY PMD


Disposition: HOME/ ROUTINE

## 2018-06-30 VITALS — SYSTOLIC BLOOD PRESSURE: 152 MMHG | HEART RATE: 70 BPM | DIASTOLIC BLOOD PRESSURE: 43 MMHG

## 2018-06-30 VITALS — RESPIRATION RATE: 20 BRPM | OXYGEN SATURATION: 99 % | TEMPERATURE: 97.7 F

## 2018-06-30 LAB
ERYTHROCYTE [DISTWIDTH] IN BLOOD BY AUTOMATED COUNT: 17.4 % (ref 11.5–14.5)
HGB BLD-MCNC: 9.3 G/DL (ref 11–16)
MCH RBC QN AUTO: 31.5 PG (ref 27–31)
MCHC RBC AUTO-ENTMCNC: 34.2 G/DL (ref 33–37)
MCV RBC AUTO: 92.1 FL (ref 81–99)
PLATELET # BLD: 222 K/UL (ref 130–400)
PMV BLD AUTO: 8 FL (ref 7.2–11.7)
RBC # BLD AUTO: 2.95 MIL/UL (ref 3.8–5.2)
WBC # BLD AUTO: 7.4 K/UL (ref 4.8–10.8)

## 2018-06-30 NOTE — OP
PROCEDURE DATE:  06/29/2018



PREOPERATIVE DIAGNOSIS:  Bleeding, left arm arteriovenous fistula.



POSTOPERATIVE DIAGNOSIS:  Bleeding, left arm arteriovenous fistula.



PROCEDURE CARRIED OUT:  Revision of arteriovenous fistula with ligation of

venous side.



SURGEON:  Sandip Mock Jr., MD



ASSISTANT:  None.



ANESTHESIOLOGIST:  _____



TYPE OF ANESTHESIA:  Local with sedation.



INDICATIONS:  The patient is a middle-aged woman with renal insufficiency,

having a large aneurysmal fistula, which was ligated proximally, had a

shunt placed around this, and had some retrograde flow through the fistula.

She picked at this where it was picked out, bleeding pursued, clotted off

above, but there was a large opening through the residual portion of the

fistula.



OPERATIVE FINDINGS:  We achieved proximal control, which was towards the

venous side.  By ligating this, there was some bleeding here, which was 300

mL of blood.  This was aneurysmal, but eventually we controlled this.  We

then removed the clot that was present, oversewn that portion of the thing,

and terminated the procedure.  Blood loss was 300 mL plus.  Operation

carried out is revision of AV fistula with ligation of venous side of

residual fistula in the left upper arm.





__________________________________________

Sandip Mock Jr., MD





cc:  Ron Silva MD



DD:  06/29/2018 15:35:07

DT:  06/29/2018 15:46:21

Job # [id number]

## 2018-06-30 NOTE — CP.PCM.CON
History of Present Illness





- History of Present Illness


History of Present Illness: 





69 yo female, well known to me. Hx of failed renal transplant, ESRD, HTN, 

valvular heart disease,multiple fractures, presents with bleeding from left AVF 

site. S/p ligation yesterday with surgeon. Has a functioning graft in this arm. 

Will have HD today via AVG and be discharged.





Review of Systems





- Constitutional


Constitutional: Fatigue.  absent: Fever





- EENT


Eyes: absent: Blurred Vision, Change in Vision


Nose/Mouth/Throat: absent: Nasal Congestion, Nasal Discharge





- Respiratory


Respiratory: absent: Cough, Dyspnea





- Gastrointestinal


Gastrointestinal: Abdominal Pain, Constipation





- Genitourinary


Additional comments: 





anuric





- Musculoskeletal


Musculoskeletal: Arthralgias, Joint Swelling, Limited Range of Motion





- Neurological


Neurological: absent: Confusion, Dizziness





Past Patient History





- Infectious Disease


Hx of Infectious Diseases: None





- Past Medical History & Family History


Past Medical History?: Yes





- Past Social History


Smoking Status: Never Smoked





- CARDIAC


Hx Congestive Heart Failure: Yes


Hx Hypertension: Yes





- PULMONARY


Hx Pneumonia: Yes





- NEUROLOGICAL


Hx Transient Ischemic Attacks (TIA): Yes





- HEENT


Hx HEENT Problems: No





- RENAL


Hx Chronic Kidney Disease: Yes





- ENDOCRINE/METABOLIC


Hx Endocrine Disorders: No





- HEMATOLOGICAL/ONCOLOGICAL


Hx Blood Disorders: Yes





- INTEGUMENTARY


Hx Dermatological Problems: No





- MUSCULOSKELETAL/RHEUMATOLOGICAL


Hx Falls: No





- GASTROINTESTINAL


Hx Diverticulitis: Yes


Hx Pancreatitis: Yes





- GENITOURINARY/GYNECOLOGICAL


Hx Genitourinary Disorders: No





- PSYCHIATRIC


Hx Anxiety: Yes


Hx Substance Use: No





- SURGICAL HISTORY


Hx Cholecystectomy: Yes





- ANESTHESIA


Hx Anesthesia: Yes


Hx Anesthesia Reactions: No


Hx Malignant Hyperthermia: No





Meds


Allergies/Adverse Reactions: 


 Allergies











Allergy/AdvReac Type Severity Reaction Status Date / Time


 


aspirin Allergy  NAUSEA Verified 04/12/18 12:54


 


lactose Allergy  NAUSEA Verified 04/12/18 12:54














- Medications


Medications: 


 Current Medications





Ascorbic Acid (Vitamin C 500 Mg Tab)  1,500 mg PO DAILY [facility]


Cinacalcet (Sensipar)  30 mg PO DAILY [facility]


Clonidine HCl (Catapres)  0.1 mg PO HS PRN


   PRN Reason: high blood pressure


Sodium Chloride (Sodium Chloride 0.9%)  1,000 mls @ 5 mls/hr IV .Q24H [facility]


Vancomycin/Sodium Chloride (Vancomycin 1 Gm/Ns 200 Ml)  1 gm in 200 mls @ 133 

mls/hr IVPB MWF [facility]


   Stop: 07/04/18 19:01


   Last Admin: 06/29/18 19:10 Dose:  133 mls/hr


Metoprolol Succinate (Toprol Xl)  100 mg PO DAILY [facility]


Oxycodone/Acetaminophen (Percocet 5/325 Mg Tab)  1 tab PO Q4H PRN


   PRN Reason: Pain, moderate (4-7)


   Stop: 07/02/18 15:35


Sertraline HCl (Zoloft)  50 mg PO BID [facility]


   Last Admin: 06/29/18 21:09 Dose:  50 mg


Sodium Polystyrene Sulfonate (Kayexalate Susp)  15 gm PO ONCE PRN











Physical Exam





- Constitutional


Appears: No Acute Distress, Chronically Ill





- Head Exam


Head Exam: ATRAUMATIC, NORMAL INSPECTION





- Eye Exam


Eye Exam: EOMI, Normal appearance





- ENT Exam


ENT Exam: Mucous Membranes Moist





- Neck Exam


Neck exam: Positive for: Normal Inspection





- Respiratory Exam


Respiratory Exam: Clear to Auscultation Bilateral, NORMAL BREATHING PATTERN





- Cardiovascular Exam


Cardiovascular Exam: Systolic Murmur.  absent: Rubs





- GI/Abdominal Exam


GI & Abdominal Exam: Normal Bowel Sounds, Soft.  absent: Tenderness





- Extremities Exam


Extremities exam: Positive for: pedal edema





- Neurological Exam


Neurological exam: Alert, Oriented x3





- Psychiatric Exam


Psychiatric exam: Normal Affect, Normal Mood





Results





- Vital Signs


Recent Vital Signs: 


 Last Vital Signs











Temp  98.5 F   06/30/18 08:06


 


Pulse  82   06/30/18 08:06


 


Resp  20   06/30/18 08:06


 


BP  135/64   06/30/18 08:06


 


Pulse Ox  96   06/30/18 08:06














- Labs


Result Diagrams: 


 06/30/18 07:46





 06/29/18 11:31


Labs: 


 Laboratory Results - last 24 hr











  06/29/18 06/29/18 06/29/18





  11:31 11:31 11:31


 


WBC  7.8  


 


RBC  3.20 L  


 


Hgb  9.8 L  


 


Hct  29.3 L  


 


MCV  91.5  D  


 


MCH  30.7  


 


MCHC  33.6  


 


RDW  17.6 H  


 


Plt Count  236  


 


MPV  7.8  


 


Neut % (Auto)  55.8  


 


Lymph % (Auto)  17.3 L  


 


Mono % (Auto)  18.6 H  


 


Eos % (Auto)  7.0 H  


 


Baso % (Auto)  1.3  


 


Neut # (Auto)  4.3  


 


Lymph # (Auto)  1.3  


 


Mono # (Auto)  1.4 H  


 


Eos # (Auto)  0.5  


 


Baso # (Auto)  0.1  


 


PT   12.7 H 


 


INR   1.2 


 


APTT   29 


 


Sodium    137


 


Potassium    3.9


 


Chloride    89 L


 


Carbon Dioxide    39 H


 


Anion Gap    13


 


BUN    34 H


 


Creatinine    3.2 H


 


Est GFR ( Amer)    17


 


Est GFR (Non-Af Amer)    14


 


POC Glucose (mg/dL)   


 


Random Glucose    84


 


Calcium    8.2 L


 


Total Bilirubin    1.0


 


AST    66 H D


 


ALT    29


 


Alkaline Phosphatase    457 H D


 


Total Protein    7.4


 


Albumin    3.6


 


Globulin    3.7


 


Albumin/Globulin Ratio    1.0


 


Blood Type   


 


Antibody Screen   














  06/29/18 06/29/18 06/30/18





  11:31 11:34 07:46


 


WBC    7.4


 


RBC    2.95 L


 


Hgb    9.3 L


 


Hct    27.2 L


 


MCV    92.1


 


MCH    31.5 H


 


MCHC    34.2


 


RDW    17.4 H


 


Plt Count    222


 


MPV    8.0


 


Neut % (Auto)   


 


Lymph % (Auto)   


 


Mono % (Auto)   


 


Eos % (Auto)   


 


Baso % (Auto)   


 


Neut # (Auto)   


 


Lymph # (Auto)   


 


Mono # (Auto)   


 


Eos # (Auto)   


 


Baso # (Auto)   


 


PT   


 


INR   


 


APTT   


 


Sodium   


 


Potassium   


 


Chloride   


 


Carbon Dioxide   


 


Anion Gap   


 


BUN   


 


Creatinine   


 


Est GFR ( Amer)   


 


Est GFR (Non-Af Amer)   


 


POC Glucose (mg/dL)   77 


 


Random Glucose   


 


Calcium   


 


Total Bilirubin   


 


AST   


 


ALT   


 


Alkaline Phosphatase   


 


Total Protein   


 


Albumin   


 


Globulin   


 


Albumin/Globulin Ratio   


 


Blood Type  O POSITIVE  


 


Antibody Screen  Negative  














Assessment & Plan





- Assessment and Plan (Free Text)


Plan: 





post ligation of venous limb of AVF


to use left AVG


HD today


discharge post HD

## 2018-07-02 NOTE — CARD
--------------- APPROVED REPORT --------------





EKG Measurement

Heart [id number]

AK [id number]

[id number]

[id number]

[id number]



<Conclusion>

Sinus rhythm with 1st degree AV block

Incomplete right bundle branch block

Nonspecific T wave abnormality.

Abnormal ECG

## 2018-07-06 ENCOUNTER — HOSPITAL ENCOUNTER (EMERGENCY)
Dept: HOSPITAL 31 - C.ER | Age: 68
Discharge: HOME | End: 2018-07-06
Payer: MEDICARE

## 2018-07-06 VITALS — OXYGEN SATURATION: 96 %

## 2018-07-06 VITALS — HEART RATE: 78 BPM | SYSTOLIC BLOOD PRESSURE: 151 MMHG | DIASTOLIC BLOOD PRESSURE: 65 MMHG | TEMPERATURE: 98.1 F

## 2018-07-06 VITALS — RESPIRATION RATE: 18 BRPM

## 2018-07-06 VITALS — BODY MASS INDEX: 26.9 KG/M2

## 2018-07-06 DIAGNOSIS — T82.898A: Primary | ICD-10-CM
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
## 2018-07-06 NOTE — C.PDOC
History Of Present Illness


69yo female, currently on dialysis with her last session this morning, comes to 

ER for evaluation of a hematoma distal to an AV fistula on her left upper arm. 

Patient had a similar issue and was evaluated by Dr. Mock on 6/29 and was 

discharged home. Otherwise, patient denies any fever, chills, arm weakness, 

numbness, other injuries. She offers no other complaints. 


Time Seen by Provider: 07/06/18 15:18


Chief Complaint (Nursing): Upper Extremity Problem/Injury


History Per: Patient


History/Exam Limitations: no limitations


Onset/Duration Of Symptoms: Hrs


Current Symptoms Are (Timing): Still Present


Additional History Per: Patient





Past Medical History


Reviewed: Historical Data, Nursing Documentation, Vital Signs


Vital Signs: 


 Last Vital Signs











Temp  98.3 F   07/06/18 14:55


 


Pulse  69   07/06/18 14:55


 


Resp  18   07/06/18 14:55


 


BP  153/65 H  07/06/18 14:55


 


Pulse Ox  96   07/06/18 15:40














- Medical History


PMH: Anxiety, Arthritis (OA), CHF, Diverticulitis, Fibromyalgia, Fractures (

Current right leg fx, RUE, LLE), HTN, Osteoporosis, Pancreatitis, Pneumonia, 

End Stage Renal Disease, Chronic Kidney Disease, TIA


Surgical History: Cholecystectomy





- [facility]








 (04/12/18)


BUNIONECT/SFT/OSTEOTOMY (12/13/13)


DILATION OF L SUBCLAV VEIN WITH INTRALUM DEV, PERC APPROACH (02/10/18)


IMMOBILIZATION OF LEFT LOWER LEG USING SPLINT (11/06/17)


IMMOBILIZATION OF RIGHT UPPER EXTREMITY USING SPLINT (11/06/17)


INSERT INFUSION DEV IN R INT JUGULAR VEIN, PERC (02/10/18)


OPEN REDUCT-INT FIX TOE (12/13/13)


REVISION OF INFUSION DEVICE IN UPPER ARTERY, OPEN APPROACH (02/10/18)


ULTRASONOGRAPHY OF HEART WITH AORTA, TRANSESOPHAGEAL (04/12/18)


ULTRASONOGRAPHY OF RIGHT JUGULAR VEINS, GUIDANCE (02/10/18)








Family History: States: No Known Family Hx





- Social History


Hx Alcohol Use: No


Hx Substance Use: No





- Immunization History


Hx Tetanus Toxoid Vaccination: No


Hx Influenza Vaccination: No


Hx Pneumococcal Vaccination: No





Review Of Systems


Except As Marked, All Systems Reviewed And Found Negative.


Skin: Positive for: Other (hematoma to left upper forearm)


Neurological: Negative for: Weakness, Numbness





Physical Exam





- Physical Exam


Appears: Non-toxic, No Acute Distress


Skin: Other (4x6 cm hematoma present distally and laterally of left forearm, 

just above elbow. No active bleeding noted.)


Head: Atraumatic, Normacephalic


Eye(s): bilateral: Normal Inspection


Neck: Normal ROM, Supple


Chest: Symmetrical


Cardiovascular: Rhythm Regular


Respiratory: Normal Breath Sounds


Extremity: Normal ROM, No Deformity, No Swelling


Neurological/Psych: Oriented x3





ED Course And Treatment


O2 Sat by Pulse Oximetry: 96





Medical Decision Making


Medical Decision Making: 





small 4x6 cm hematoma distal to but above elbow of L upper arm AV fistula


seen evaled by Surgery/vascular


ok for opt f/u.


AV fistula still ok for normal HD use.





Disposition


Doctor Will See Patient In The: Office


Counseled Patient/Family Regarding: Studies Performed, Diagnosis





- Disposition


Disposition: HOME/ ROUTINE


Disposition Time: 15:39


Condition: GOOD


Forms:  [facility] (English)





- Clinical Impression


Clinical Impression: 


 Hematoma, Arteriovenous fistula








- Scribe Statement


The provider has reviewed the documentation as recorded by the Monica Hill


Provider Attestation: 


All medical record entries made by the Monica were at my direction and 

personally dictated by me. I have reviewed the chart and agree that the record 

accurately reflects my personal performance of the history, physical exam, 

medical decision making, and the department course for this patient. I have 

also personally directed, reviewed, and agree with the discharge instructions 

and disposition.